# Patient Record
Sex: MALE | Race: WHITE | NOT HISPANIC OR LATINO | Employment: OTHER | URBAN - METROPOLITAN AREA
[De-identification: names, ages, dates, MRNs, and addresses within clinical notes are randomized per-mention and may not be internally consistent; named-entity substitution may affect disease eponyms.]

---

## 2017-07-03 ENCOUNTER — HOSPITAL ENCOUNTER (OUTPATIENT)
Dept: RADIOLOGY | Facility: HOSPITAL | Age: 62
Discharge: HOME | End: 2017-07-03
Attending: ORTHOPAEDIC SURGERY
Payer: MEDICAID

## 2017-07-03 ENCOUNTER — CLINICAL SUPPORT (OUTPATIENT)
Dept: LAB | Facility: HOSPITAL | Age: 62
End: 2017-07-03
Attending: ORTHOPAEDIC SURGERY
Payer: MEDICAID

## 2017-07-03 DIAGNOSIS — Z01.818 PREOP TESTING: ICD-10-CM

## 2017-07-03 DIAGNOSIS — Z01.818 PREOP TESTING: Primary | ICD-10-CM

## 2017-07-03 PROCEDURE — 71020 XR CHEST PA AND LATERAL: CPT | Mod: TC

## 2017-07-03 PROCEDURE — 93005 ELECTROCARDIOGRAM TRACING: CPT

## 2017-07-03 PROCEDURE — 71020 XR CHEST PA AND LATERAL: CPT | Mod: 26,,, | Performed by: RADIOLOGY

## 2017-07-26 ENCOUNTER — HOSPITAL ENCOUNTER (OUTPATIENT)
Dept: PREADMISSION TESTING | Facility: HOSPITAL | Age: 62
Discharge: HOME | End: 2017-07-26
Attending: ORTHOPAEDIC SURGERY
Payer: MEDICAID

## 2017-07-26 ENCOUNTER — ANESTHESIA EVENT (OUTPATIENT)
Dept: SURGERY | Facility: HOSPITAL | Age: 62
DRG: 470 | End: 2017-07-26
Payer: MEDICAID

## 2017-07-26 VITALS
HEIGHT: 70 IN | WEIGHT: 209.44 LBS | RESPIRATION RATE: 17 BRPM | SYSTOLIC BLOOD PRESSURE: 127 MMHG | TEMPERATURE: 99 F | OXYGEN SATURATION: 97 % | HEART RATE: 67 BPM | BODY MASS INDEX: 29.98 KG/M2 | DIASTOLIC BLOOD PRESSURE: 59 MMHG

## 2017-07-26 RX ORDER — LIDOCAINE HYDROCHLORIDE 10 MG/ML
1 INJECTION, SOLUTION EPIDURAL; INFILTRATION; INTRACAUDAL; PERINEURAL ONCE
Status: CANCELLED | OUTPATIENT
Start: 2017-07-26 | End: 2017-07-26

## 2017-07-26 RX ORDER — SODIUM CHLORIDE, SODIUM LACTATE, POTASSIUM CHLORIDE, CALCIUM CHLORIDE 600; 310; 30; 20 MG/100ML; MG/100ML; MG/100ML; MG/100ML
INJECTION, SOLUTION INTRAVENOUS CONTINUOUS
Status: CANCELLED | OUTPATIENT
Start: 2017-07-26

## 2017-07-26 NOTE — DISCHARGE INSTRUCTIONS
Your surgery is scheduled for 08/01/17.    Please report to Outpatient Surgery Intake Office on the 2nd FLOOR at 0530 a.m.          INSTRUCTIONS IMPORTANT!!!  ¨ Do not eat or drink after 12 midnight-including water. OK to brush teeth, no   gum, candy or mints!    ¨ Take only these medicines with a small swallow of water-morning of surgery. lisinopril        ____  Proceed to [facility] on 07/26/17 for additional blood test.        ____  Do not wear makeup, including mascara.  ____  No powder, lotions or creams to surgical area.  ____  Please remove all jewelry, including piercings and leave at home.  ____  No money or valuables needed. Please leave at home.  ____  Please bring any documents given by your doctor.  ____  If going home the same day, arrange for a ride home. You will not be able to             drive if Anesthesia was used.  ____  Children under 18 years require a parent / guardian present the entire time             they are in surgery / recovery.  ____  Wear loose fitting clothing. Allow for dressings, bandages.  ____  Stop Aspirin, Ibuprofen, Motrin and Aleve at least 3-5 days before surgery, unless otherwise instructed by your doctor, or the nurse.   You MAY use Tylenol/acetaminophen until day of surgery.  ____  Wash the surgical area with Hibiclens the night before surgery, and again the             morning of surgery.  Be sure to rinse hibiclens off completely (if instructed by   nurse).  ____  If you take diabetic medication, do not take am of surgery unless instructed by Doctor.  ____  Call MD for temperature above 101 degrees.  ____ Stop taking any Fish Oil supplement or any Vitamins that contain Vitamin E at least 5 days prior to surgery.  ____ Do Not wear your contact lenses the day of your procedure.  You may wear your glasses.        I have read or had read and explained to me, and understand the above information.  Additional comments or instructions:  For additional questions  call [phone]     Take a Hibiclens shower twice a day for 3 days prior to surgery, including the morning of surgery.   Gargle with Listerine twice a day for 3 days prior to surgery, including the morning of surgery.      Pre-Op Bathing Instructions    Before surgery, you can play an important role in your own health.    Because skin is not sterile, we need to be sure that your skin is as free of germs as possible. By following the instructions below, you can reduce the number of germs on your skin before surgery.    IMPORTANT: You will need to shower with a special soap called Hibiclens*, available at any pharmacy.  If you are allergic to Chlorhexidine (the antiseptic in Hibiclens), use an antibacterial soap such as Dial Soap for your preoperative shower.  You will shower with Hibiclens both the night before your surgery and the morning of your surgery.  Do not use Hibiclens on the head, face or genitals to avoid injury to those areas.    STEP #1: THE NIGHT BEFORE YOUR SURGERY     1. Do not shave the area of your body where your surgery will be performed.  2. Shower and wash your hair and body as usual with your normal soap and shampoo.  3. Rinse your hair and body thoroughly after you shower to remove all soap residue.  4. With your hand, apply one packet of Hibiclens soap to the surgical site.   5. Wash the site gently for five (5) minutes. Do not scrub your skin too hard.   6. Do not wash with your regular soap after Hibiclens is used.  7. Rinse your body thoroughly.  8. Pat yourself dry with a clean, soft towel.  9. Do not use lotion, cream, or powder.  10. Wear clean clothes.    STEP #2: THE MORNING OF YOUR SURGERY     1. Repeat Step #1.    * Not to be used by people allergic to Chlorhexidine.      Total Hip Replacement  Total hip replacement surgery almost always reduces joint pain. During this surgery, your problem hip joint is replaced with an artificial joint, called a prosthesis.  Benefits of hip  replacement  Total hip replacement surgery almost always:  · Stops or greatly reduces hip pain. Even the pain from surgery should go away within weeks.  · Increases leg function. Without hip pain, youll be able to use your legs more. This will build up your muscles.  · Improves quality of life by allowing you to do daily tasks and low-impact activities in greater comfort.  · Provides years of easier movement. Most total hip replacements last for many years.  Your surgical experience  You will most likely arrive at the hospital on the morning of surgery. In many cases, pre-op tests are done days or even weeks ahead of time. Follow all of your surgeons instructions on preparing for surgery. When you arrive, youll be given forms to fill out. You will also talk with the anesthesiologist,  the doctor who gives the anesthesia, if you havent done so already.  Preparing for surgery  You will be told when to stop eating and drinking before surgery. If you take daily medicines, especially blood thinners, ask your doctor if you should still take them the morning of surgery. You may need to stop certain medicines several days or weeks before the surgery. At the hospital your temperature, pulse, breathing, and blood pressure will be checked. An IV (intravenous) line will be started to provide fluids and medicines needed during surgery.    The surgical procedure  When the surgical team is ready, youll be taken to the operating room. There youll be given anesthesia. The anesthesia will help you sleep through surgery, or it will make you numb from the waist down. Then an incision is made, giving the surgeon access to your hip joint. The damaged ball is removed, and the socket is prepared to hold the prosthesis. After the new joint is in place, the incision is closed with staples or stitches.  Preparing the bone  The hip is a ball-and-socket joint. The ball is cut from the thighbone, and the surface of the old socket is  smoothed. Then the new socket is put into the pelvis. The socket is usually press-fit and may be held in place with screws. A press-fit prosthesis has tiny pores on its surface that your bone will grow into. Cement or press-fit may be used to hold the ball-and-stem portion of the hip replacement.    Joining the new parts  The new hip stem is inserted into the upper portion of your thighbone. After the stem is secure in the thighbone, the new ball and socket are joined. The stem of the prosthesis may be held with cement or press-fit. Your surgeon will choose the method that is best for you.  In the recovery room  After surgery youll be sent to the recovery room, also called the PACU (postanesthesia care unit). Your condition will be watched closely, and youll be given pain medications. You may have a catheter (small tube) in your bladder and a drain in your hip. To keep your new joint stable, a foam wedge or pillows may be placed between your legs. In some cases, a brace is used.  Risks and complications  As with any surgery, hip replacement has possible risks and complications. These include the following:  · Reaction to the anesthesia  · Blood clots  · Infection  · Dislocation of the joint or loosening of the prosthesis  · Fracture  · Wearing out the prosthetic  · Damage to nearby blood vessels, bones, or nerves  · Thigh pain   Date Last Reviewed: 8/28/2015  © 9126-1925 [employer]. [address] [place] [address]. All rights reserved. This information is not intended as a substitute for professional medical care. Always follow your healthcare professional's instructions.    Getting Ready for Hip Replacement Surgery  Take steps ahead of time to make recovery easier and safe. Make a few simple changes around the house. Also, arrange for someone to help you for a couple of weeks after your hip replacement. Before surgery, your surgeon may talk with you about ways to manage blood loss. You  may also be told to see your primary care healthcare provider and your dentist. Treating health and dental problems now may improve healing after a joint replacement. If youre a smoker, do your best to stop or cut down.    Prepare at home  Here are some suggestions:   · Stock up on canned and frozen foods. Store all supplies between waist and shoulder level.  · Prepare a room on the main living level if you usually sleep upstairs. Or set things up so you have to go upstairs only once a day.  ·  clutter and remove throw rugs. Tape down electrical cords.  Use special equipment  Special equipment may help you have a safer and easier recovery. If pre-op training is offered, try using the devices before surgery. Some of the most helpful equipment is listed below.  · An elevated toilet seat  · A shower seat  · Handrails and grab bars to help you steady yourself, especially when getting in and out of the tub  · A grasping device to pull on socks and shoes     Managing blood loss  During major surgery, a significant amount of blood loss can happen. This can cause the bodys red blood cell level to drop below a healthy range. Blood management can help prepare your body for surgery and recovery. There are three main ways to manage blood loss:   · You may be able to donate your own blood before surgery. Then you can receive your banked blood as needed.  · You may receive blood donated by another person. This blood is screened to rule out disease.  · A drug called epoetin marjorie can be given before surgery. This drug stimulates the body to produce red blood cells, sometimes reducing the need for transfusion.  See your healthcare provider  Expect the following:  · You are likely to have lab or blood tests. You also may have an ECG (electrocardiogram). A chest X-ray may also be taken.  · Your healthcare provider will talk with you about health problems, such as diabetes and high blood pressure, which need to be controlled  before surgery.  · Work with your healthcare provider to clear up any infections before surgery. Doing so helps ensure a better recovery.  · For your best recovery, youll be told to take care of yourself ahead of time. Maintain proper nutrition and reduce alcohol intake.  · If you smoke, quit before surgery if possible. This may improve healing and reduce complications after joint replacement.  Discuss your medicines  Tell your surgeon about all of the medicines you take. This includes herbs and supplements, as well as prescription and over-the-counter medicines. Some medicines dont go well with anesthesia. Others, such as aspirin, blood thinners, and ibuprofen, increase bleeding. To avoid problems during surgery, you may need to stop taking certain medicines before hip replacement.  Finish dental work  Have any tooth or gum problems treated before surgery. Also, finish any dental work that may be under way. If you dont, germs in your mouth could enter the bloodstream and infect the new joint. This could delay your recovery. In an extreme case, infection in the new joint might mean that the prosthesis would have to be removed.  Date Last Reviewed: 10/1/2015  © 2841-9220 [employer]. [address] [place] [address]. All rights reserved. This information is not intended as a substitute for professional medical care. Always follow your healthcare professional's instructions.    Anesthesia: General Anesthesia  Youre due to have surgery. During surgery, youll be given medication called anesthesia. (It is also called anesthetic.) This will keep you comfortable and pain-free. Your anesthesia provider will use general anesthesia. This sheet tells you more about it.  What is general anesthesia?     You are watched continuously during your procedure by the anesthesia provider   General anesthesia puts you into a state like deep sleep. It goes into the bloodstream (IV anesthetics), into the lungs  (gas anesthetics), or both. You feel nothing during the procedure. You will not remember it. During the procedure, the anesthesia provider monitors you continuously. He or she checks your heart rate and rhythm, blood pressure, breathing, and blood oxygen.  · IV Anesthetics. IV anesthetics are given through an IV line in your arm. Theyre often given first. This is so you are asleep before a gas anesthetic is started. Some kinds of IV anesthetics relieve pain. Others relax you. Your doctor will decide which kind is best in your case.  · Gas Anesthetics. Gas anesthetics are breathed into the lungs. They are often used to keep you asleep. They can be given through a facemask or a tube placed in your larynx or trachea (breathing tube).  ¨ If you have a facemask, your anesthesia provider will most likely place it over your nose and mouth while youre still awake. Youll breathe oxygen through the mask as your IV anesthetic is started. Gas anesthetic may be added through the mask.  ¨ If you have a tube in the larynx or trachea, it will be inserted into your throat after youre asleep.  Anesthesia tools and medications  You will likely have:  · IV anesthetics. These are put into an IV line into your bloodstream.  · Gas anesthetics. You breathe these anesthetics into your lungs, where they pass into your bloodstream.  · Pulse oximeter. This is a small clip that is attached to the end of your finger. This measures your blood oxygen level.  · Electrocardiography leads (electrodes). These are small sticky pads that are placed on your chest. They record your heart rate and rhythm.  · Blood pressure cuff. This reads your blood pressure.  Risks and possible complications  General anesthesia has some risks. These include:  · Breathing problems  · Nausea and vomiting  · Sore throat or hoarseness (usually temporary)  · Allergic reaction to the anesthetic  · Irregular heartbeat (rare)  · Cardiac arrest (rare)   Anesthesia  safety  · Follow all instructions you are given for how long not to eat or drink before your procedure.  · Be sure your doctor knows what medications and drugs you take. This includes over-the-counter medications, herbs, supplements, alcohol or other drugs. You will be asked when those were last taken.  · Have an adult family member or friend drive you home after the procedure.  · For the first 24 hours after your surgery:  ¨ Do not drive or use heavy equipment.  ¨ Have a trusted family member or spouse make important decisions or sign documents.  ¨ Avoid alcohol.  ¨ Have a responsible adult stay with you. He or she can watch for problems and help keep you safe.  Date Last Reviewed: 10/16/2014  © 8291-8627 [employer] [address] [place] [address], [place] [address]. All rights reserved. This information is not intended as a substitute for professional medical care. Always follow your healthcare professional's instructions.

## 2017-07-26 NOTE — ANESTHESIA PREPROCEDURE EVALUATION
07/26/2017  Heri Medina is a 61 y.o., male for right hip arthroplasty anterior approach under GETA on 8-1-17    Past Medical History:   Diagnosis Date    Hyperlipidemia     Hypertension        Past Surgical History:   Procedure Laterality Date    APPENDECTOMY  1984    hydrocel  1978    scrotum           Anesthesia Evaluation    I have reviewed the Patient Summary Reports.    I have reviewed the Nursing Notes.   I have reviewed the Medications.     Review of Systems  Anesthesia Hx:  No problems with previous Anesthesia  Denies Family Hx of Anesthesia complications.   Denies Personal Hx of Anesthesia complications.   Social:  Former Smoker, Alcohol Use Quit smoking in 2005  Smokes cigars   Hematology/Oncology:  Hematology Normal   Oncology Normal     EENT/Dental:EENT/Dental Normal   Cardiovascular:   Exercise tolerance: good Hypertension, well controlled hyperlipidemia <3METS   Pulmonary:   Sleep Apnea, CPAP CPap setting 4   Renal/:  Renal/ Normal     Hepatic/GI:  Hepatic/GI Normal    Musculoskeletal:   Arthritis (right hip)     Neurological:  Neurology Normal    Endocrine:   Diabetes, well controlled, type 2  Diabetes , most recent HgA1c value was 6.0 on 3/2017.    Psych:  Psychiatric Normal           Physical Exam  General:  Well nourished    Airway/Jaw/Neck:  Airway Findings: Mouth Opening: Normal General Airway Assessment: Adult  Mallampati: III  Improves to II with phonation.  TM Distance: Normal, at least 6 cm  Jaw/Neck Findings:  Neck ROM: Normal ROM      Dental:  Dental Findings: In tact, Periodontal disease, Mild   Chest/Lungs:  Chest/Lungs Findings: Clear to auscultation, Normal Respiratory Rate     Heart/Vascular:  Heart Findings: Rhythm: Regular Rhythm  Sounds: Normal        Mental Status:  Mental Status Findings:  Cooperative, Alert and Oriented         Anesthesia Plan  Type of Anesthesia,  risks & benefits discussed:  Anesthesia Type:  general, regional  Patient's Preference:   Intra-op Monitoring Plan:   Intra-op Monitoring Plan Comments:   Post Op Pain Control Plan: multimodal analgesia and IV/PO Opioids PRN  Post Op Pain Control Plan Comments:   Induction:   IV  Beta Blocker:  Patient is not currently on a Beta-Blocker (No further documentation required).       Informed Consent: Patient understands risks and agrees with Anesthesia plan.  Questions answered.   ASA Score: 2     Day of Surgery Review of History & Physical:    H&P update referred to the surgeon.         Ready For Surgery From Anesthesia Perspective.

## 2017-08-01 ENCOUNTER — ANESTHESIA (OUTPATIENT)
Dept: SURGERY | Facility: HOSPITAL | Age: 62
DRG: 470 | End: 2017-08-01
Payer: MEDICAID

## 2017-08-01 ENCOUNTER — HOSPITAL ENCOUNTER (INPATIENT)
Facility: HOSPITAL | Age: 62
LOS: 1 days | Discharge: HOME | DRG: 470 | End: 2017-08-02
Attending: ORTHOPAEDIC SURGERY | Admitting: ORTHOPAEDIC SURGERY
Payer: MEDICAID

## 2017-08-01 DIAGNOSIS — M25.559 HIP PAIN: ICD-10-CM

## 2017-08-01 DIAGNOSIS — E78.5 HYPERLIPIDEMIA ASSOCIATED WITH TYPE 2 DIABETES MELLITUS: ICD-10-CM

## 2017-08-01 DIAGNOSIS — E11.59 HYPERTENSION ASSOCIATED WITH DIABETES: ICD-10-CM

## 2017-08-01 DIAGNOSIS — E11.69 HYPERLIPIDEMIA ASSOCIATED WITH TYPE 2 DIABETES MELLITUS: ICD-10-CM

## 2017-08-01 DIAGNOSIS — I15.2 HYPERTENSION ASSOCIATED WITH DIABETES: ICD-10-CM

## 2017-08-01 LAB
BASOPHILS # BLD AUTO: 0.03 K/UL
BASOPHILS NFR BLD: 0.4 %
DIFFERENTIAL METHOD: ABNORMAL
EOSINOPHIL # BLD AUTO: 0 K/UL
EOSINOPHIL NFR BLD: 0.5 %
ERYTHROCYTE [DISTWIDTH] IN BLOOD BY AUTOMATED COUNT: 13.7 %
HCT VFR BLD AUTO: 40.7 %
HGB BLD-MCNC: 13 G/DL
LYMPHOCYTES # BLD AUTO: 1 K/UL
LYMPHOCYTES NFR BLD: 12.6 %
MCH RBC QN AUTO: 25.1 PG
MCHC RBC AUTO-ENTMCNC: 31.9 G/DL
MCV RBC AUTO: 79 FL
MONOCYTES # BLD AUTO: 0.2 K/UL
MONOCYTES NFR BLD: 2.4 %
NEUTROPHILS # BLD AUTO: 6.7 K/UL
NEUTROPHILS NFR BLD: 83.7 %
PLATELET # BLD AUTO: 227 K/UL
PMV BLD AUTO: 9.8 FL
POCT GLUCOSE: 111 MG/DL (ref 70–110)
RBC # BLD AUTO: 5.18 M/UL
WBC # BLD AUTO: 7.96 K/UL

## 2017-08-01 PROCEDURE — 0SR90JZ REPLACEMENT OF RIGHT HIP JOINT WITH SYNTHETIC SUBSTITUTE, OPEN APPROACH: ICD-10-PCS | Performed by: ORTHOPAEDIC SURGERY

## 2017-08-01 PROCEDURE — 97110 THERAPEUTIC EXERCISES: CPT

## 2017-08-01 PROCEDURE — 25000003 PHARM REV CODE 250: Performed by: ANESTHESIOLOGY

## 2017-08-01 PROCEDURE — 25000003 PHARM REV CODE 250: Performed by: NURSE ANESTHETIST, CERTIFIED REGISTERED

## 2017-08-01 PROCEDURE — 71000039 HC RECOVERY, EACH ADD'L HOUR: Performed by: ORTHOPAEDIC SURGERY

## 2017-08-01 PROCEDURE — 97165 OT EVAL LOW COMPLEX 30 MIN: CPT

## 2017-08-01 PROCEDURE — 63600175 PHARM REV CODE 636 W HCPCS: Performed by: NURSE ANESTHETIST, CERTIFIED REGISTERED

## 2017-08-01 PROCEDURE — 27201423 OPTIME MED/SURG SUP & DEVICES STERILE SUPPLY: Performed by: ORTHOPAEDIC SURGERY

## 2017-08-01 PROCEDURE — 97161 PT EVAL LOW COMPLEX 20 MIN: CPT

## 2017-08-01 PROCEDURE — 36000711: Performed by: ORTHOPAEDIC SURGERY

## 2017-08-01 PROCEDURE — 63600175 PHARM REV CODE 636 W HCPCS: Performed by: ORTHOPAEDIC SURGERY

## 2017-08-01 PROCEDURE — 36000710: Performed by: ORTHOPAEDIC SURGERY

## 2017-08-01 PROCEDURE — 25000003 PHARM REV CODE 250: Performed by: ORTHOPAEDIC SURGERY

## 2017-08-01 PROCEDURE — C1776 JOINT DEVICE (IMPLANTABLE): HCPCS | Performed by: ORTHOPAEDIC SURGERY

## 2017-08-01 PROCEDURE — 37000009 HC ANESTHESIA EA ADD 15 MINS: Performed by: ORTHOPAEDIC SURGERY

## 2017-08-01 PROCEDURE — 85025 COMPLETE CBC W/AUTO DIFF WBC: CPT

## 2017-08-01 PROCEDURE — 36415 COLL VENOUS BLD VENIPUNCTURE: CPT

## 2017-08-01 PROCEDURE — 71000033 HC RECOVERY, INTIAL HOUR: Performed by: ORTHOPAEDIC SURGERY

## 2017-08-01 PROCEDURE — C1729 CATH, DRAINAGE: HCPCS | Performed by: ORTHOPAEDIC SURGERY

## 2017-08-01 PROCEDURE — 37000008 HC ANESTHESIA 1ST 15 MINUTES: Performed by: ORTHOPAEDIC SURGERY

## 2017-08-01 PROCEDURE — 97116 GAIT TRAINING THERAPY: CPT

## 2017-08-01 PROCEDURE — 11000001 HC ACUTE MED/SURG PRIVATE ROOM

## 2017-08-01 PROCEDURE — 94761 N-INVAS EAR/PLS OXIMETRY MLT: CPT

## 2017-08-01 DEVICE — SHELL ACE CLUSTER 60MM TTNM: Type: IMPLANTABLE DEVICE | Site: HIP | Status: FUNCTIONAL

## 2017-08-01 DEVICE — INSERT TRIDENT 36MM: Type: IMPLANTABLE DEVICE | Site: HIP | Status: FUNCTIONAL

## 2017-08-01 DEVICE — STEM ACC II 27 DEG SZ 5: Type: IMPLANTABLE DEVICE | Site: HIP | Status: FUNCTIONAL

## 2017-08-01 DEVICE — HEAD FEM V40 36MM +0MM BIO: Type: IMPLANTABLE DEVICE | Site: HIP | Status: FUNCTIONAL

## 2017-08-01 RX ORDER — KETAMINE HYDROCHLORIDE 50 MG/ML
INJECTION, SOLUTION INTRAMUSCULAR; INTRAVENOUS
Status: DISCONTINUED | OUTPATIENT
Start: 2017-08-01 | End: 2017-08-01

## 2017-08-01 RX ORDER — METFORMIN HYDROCHLORIDE 500 MG/1
500 TABLET ORAL
Status: DISCONTINUED | OUTPATIENT
Start: 2017-08-02 | End: 2017-08-02 | Stop reason: HOSPADM

## 2017-08-01 RX ORDER — SODIUM CHLORIDE 0.9 % (FLUSH) 0.9 %
3 SYRINGE (ML) INJECTION
Status: DISCONTINUED | OUTPATIENT
Start: 2017-08-01 | End: 2017-08-02 | Stop reason: HOSPADM

## 2017-08-01 RX ORDER — DEXAMETHASONE SODIUM PHOSPHATE 4 MG/ML
10 INJECTION, SOLUTION INTRA-ARTICULAR; INTRALESIONAL; INTRAMUSCULAR; INTRAVENOUS; SOFT TISSUE ONCE
Status: COMPLETED | OUTPATIENT
Start: 2017-08-01 | End: 2017-08-01

## 2017-08-01 RX ORDER — FENTANYL CITRATE 50 UG/ML
INJECTION, SOLUTION INTRAMUSCULAR; INTRAVENOUS
Status: DISCONTINUED | OUTPATIENT
Start: 2017-08-01 | End: 2017-08-01

## 2017-08-01 RX ORDER — GLYCOPYRROLATE 0.2 MG/ML
INJECTION INTRAMUSCULAR; INTRAVENOUS
Status: DISCONTINUED | OUTPATIENT
Start: 2017-08-01 | End: 2017-08-01

## 2017-08-01 RX ORDER — NAPROXEN SODIUM 220 MG/1
81 TABLET, FILM COATED ORAL 2 TIMES DAILY
Status: DISCONTINUED | OUTPATIENT
Start: 2017-08-01 | End: 2017-08-02 | Stop reason: HOSPADM

## 2017-08-01 RX ORDER — ACETAMINOPHEN 10 MG/ML
1000 INJECTION, SOLUTION INTRAVENOUS EVERY 8 HOURS
Status: DISCONTINUED | OUTPATIENT
Start: 2017-08-01 | End: 2017-08-02 | Stop reason: HOSPADM

## 2017-08-01 RX ORDER — LIDOCAINE HYDROCHLORIDE 10 MG/ML
1 INJECTION, SOLUTION EPIDURAL; INFILTRATION; INTRACAUDAL; PERINEURAL ONCE
Status: DISCONTINUED | OUTPATIENT
Start: 2017-08-01 | End: 2017-08-01

## 2017-08-01 RX ORDER — HYDROMORPHONE HYDROCHLORIDE 2 MG/ML
INJECTION, SOLUTION INTRAMUSCULAR; INTRAVENOUS; SUBCUTANEOUS
Status: DISCONTINUED | OUTPATIENT
Start: 2017-08-01 | End: 2017-08-01

## 2017-08-01 RX ORDER — TRANEXAMIC ACID 100 MG/ML
1000 INJECTION, SOLUTION INTRAVENOUS
Status: DISCONTINUED | OUTPATIENT
Start: 2017-08-01 | End: 2017-08-01

## 2017-08-01 RX ORDER — CEFAZOLIN SODIUM 2 G/50ML
2 SOLUTION INTRAVENOUS
Status: DISCONTINUED | OUTPATIENT
Start: 2017-08-01 | End: 2017-08-01

## 2017-08-01 RX ORDER — MIDAZOLAM HYDROCHLORIDE 1 MG/ML
INJECTION INTRAMUSCULAR; INTRAVENOUS
Status: DISCONTINUED | OUTPATIENT
Start: 2017-08-01 | End: 2017-08-01

## 2017-08-01 RX ORDER — OXYCODONE AND ACETAMINOPHEN 5; 325 MG/1; MG/1
1 TABLET ORAL
Status: DISCONTINUED | OUTPATIENT
Start: 2017-08-01 | End: 2017-08-01

## 2017-08-01 RX ORDER — ZOLPIDEM TARTRATE 5 MG/1
5 TABLET ORAL NIGHTLY PRN
Status: DISCONTINUED | OUTPATIENT
Start: 2017-08-01 | End: 2017-08-02 | Stop reason: HOSPADM

## 2017-08-01 RX ORDER — FAMOTIDINE 20 MG/1
20 TABLET, FILM COATED ORAL 2 TIMES DAILY
Status: DISCONTINUED | OUTPATIENT
Start: 2017-08-01 | End: 2017-08-02 | Stop reason: HOSPADM

## 2017-08-01 RX ORDER — CEFAZOLIN SODIUM 2 G/50ML
2 SOLUTION INTRAVENOUS
Status: COMPLETED | OUTPATIENT
Start: 2017-08-01 | End: 2017-08-02

## 2017-08-01 RX ORDER — TRANEXAMIC ACID 100 MG/ML
1000 INJECTION, SOLUTION INTRAVENOUS
Status: COMPLETED | OUTPATIENT
Start: 2017-08-01 | End: 2017-08-01

## 2017-08-01 RX ORDER — MUPIROCIN 20 MG/G
1 OINTMENT TOPICAL
Status: DISCONTINUED | OUTPATIENT
Start: 2017-08-01 | End: 2017-08-01

## 2017-08-01 RX ORDER — SODIUM CHLORIDE 0.9 % (FLUSH) 0.9 %
3 SYRINGE (ML) INJECTION EVERY 8 HOURS
Status: DISCONTINUED | OUTPATIENT
Start: 2017-08-01 | End: 2017-08-01

## 2017-08-01 RX ORDER — SODIUM CHLORIDE 9 MG/ML
INJECTION, SOLUTION INTRAVENOUS CONTINUOUS
Status: ACTIVE | OUTPATIENT
Start: 2017-08-01 | End: 2017-08-02

## 2017-08-01 RX ORDER — SIMVASTATIN 40 MG/1
40 TABLET, FILM COATED ORAL NIGHTLY
Status: DISCONTINUED | OUTPATIENT
Start: 2017-08-01 | End: 2017-08-02 | Stop reason: HOSPADM

## 2017-08-01 RX ORDER — LIDOCAINE HCL/PF 100 MG/5ML
SYRINGE (ML) INTRAVENOUS
Status: DISCONTINUED | OUTPATIENT
Start: 2017-08-01 | End: 2017-08-01

## 2017-08-01 RX ORDER — PROPOFOL 10 MG/ML
VIAL (ML) INTRAVENOUS CONTINUOUS PRN
Status: DISCONTINUED | OUTPATIENT
Start: 2017-08-01 | End: 2017-08-01

## 2017-08-01 RX ORDER — SODIUM CHLORIDE, SODIUM LACTATE, POTASSIUM CHLORIDE, CALCIUM CHLORIDE 600; 310; 30; 20 MG/100ML; MG/100ML; MG/100ML; MG/100ML
INJECTION, SOLUTION INTRAVENOUS CONTINUOUS
Status: DISCONTINUED | OUTPATIENT
Start: 2017-08-01 | End: 2017-08-01

## 2017-08-01 RX ORDER — LISINOPRIL 5 MG/1
5 TABLET ORAL DAILY
Status: DISCONTINUED | OUTPATIENT
Start: 2017-08-01 | End: 2017-08-02 | Stop reason: HOSPADM

## 2017-08-01 RX ORDER — HYDROMORPHONE HYDROCHLORIDE 2 MG/ML
0.5 INJECTION, SOLUTION INTRAMUSCULAR; INTRAVENOUS; SUBCUTANEOUS
Status: DISCONTINUED | OUTPATIENT
Start: 2017-08-01 | End: 2017-08-02 | Stop reason: HOSPADM

## 2017-08-01 RX ORDER — ONDANSETRON 2 MG/ML
4 INJECTION INTRAMUSCULAR; INTRAVENOUS DAILY PRN
Status: DISCONTINUED | OUTPATIENT
Start: 2017-08-01 | End: 2017-08-01

## 2017-08-01 RX ORDER — ONDANSETRON 2 MG/ML
4 INJECTION INTRAMUSCULAR; INTRAVENOUS EVERY 12 HOURS PRN
Status: DISCONTINUED | OUTPATIENT
Start: 2017-08-01 | End: 2017-08-02 | Stop reason: HOSPADM

## 2017-08-01 RX ORDER — HYDROMORPHONE HYDROCHLORIDE 2 MG/ML
0.4 INJECTION, SOLUTION INTRAMUSCULAR; INTRAVENOUS; SUBCUTANEOUS EVERY 5 MIN PRN
Status: DISCONTINUED | OUTPATIENT
Start: 2017-08-01 | End: 2017-08-01

## 2017-08-01 RX ORDER — OXYCODONE HYDROCHLORIDE 5 MG/1
5 TABLET ORAL EVERY 4 HOURS PRN
Status: DISCONTINUED | OUTPATIENT
Start: 2017-08-01 | End: 2017-08-02 | Stop reason: HOSPADM

## 2017-08-01 RX ADMIN — EPHEDRINE SULFATE 5 MG: 50 INJECTION, SOLUTION INTRAMUSCULAR; INTRAVENOUS; SUBCUTANEOUS at 09:08

## 2017-08-01 RX ADMIN — SODIUM CHLORIDE, SODIUM LACTATE, POTASSIUM CHLORIDE, AND CALCIUM CHLORIDE: .6; .31; .03; .02 INJECTION, SOLUTION INTRAVENOUS at 10:08

## 2017-08-01 RX ADMIN — CEFAZOLIN SODIUM 2 G: 2 SOLUTION INTRAVENOUS at 04:08

## 2017-08-01 RX ADMIN — HYDROMORPHONE HYDROCHLORIDE 0.4 MG: 2 INJECTION INTRAMUSCULAR; INTRAVENOUS; SUBCUTANEOUS at 10:08

## 2017-08-01 RX ADMIN — OXYCODONE AND ACETAMINOPHEN 1 TABLET: 5; 325 TABLET ORAL at 12:08

## 2017-08-01 RX ADMIN — LISINOPRIL 5 MG: 5 TABLET ORAL at 04:08

## 2017-08-01 RX ADMIN — MIDAZOLAM HYDROCHLORIDE 2 MG: 1 INJECTION, SOLUTION INTRAMUSCULAR; INTRAVENOUS at 06:08

## 2017-08-01 RX ADMIN — PROPOFOL 150 MCG/KG/MIN: 10 INJECTION, EMULSION INTRAVENOUS at 07:08

## 2017-08-01 RX ADMIN — FENTANYL CITRATE 50 MCG: 50 INJECTION, SOLUTION INTRAMUSCULAR; INTRAVENOUS at 09:08

## 2017-08-01 RX ADMIN — OXYCODONE AND ACETAMINOPHEN 1 TABLET: 5; 325 TABLET ORAL at 03:08

## 2017-08-01 RX ADMIN — SIMVASTATIN 40 MG: 40 TABLET, FILM COATED ORAL at 09:08

## 2017-08-01 RX ADMIN — LIDOCAINE HYDROCHLORIDE 75 MG: 20 INJECTION, SOLUTION INTRAVENOUS at 07:08

## 2017-08-01 RX ADMIN — TRANEXAMIC ACID 1 G: 100 INJECTION, SOLUTION INTRAVENOUS at 07:08

## 2017-08-01 RX ADMIN — EPHEDRINE SULFATE 5 MG: 50 INJECTION, SOLUTION INTRAMUSCULAR; INTRAVENOUS; SUBCUTANEOUS at 08:08

## 2017-08-01 RX ADMIN — DEXTROSE 2 G: 50 INJECTION, SOLUTION INTRAVENOUS at 07:08

## 2017-08-01 RX ADMIN — SODIUM CHLORIDE, SODIUM LACTATE, POTASSIUM CHLORIDE, AND CALCIUM CHLORIDE: .6; .31; .03; .02 INJECTION, SOLUTION INTRAVENOUS at 07:08

## 2017-08-01 RX ADMIN — FENTANYL CITRATE 10 MCG: 50 INJECTION, SOLUTION INTRAMUSCULAR; INTRAVENOUS at 07:08

## 2017-08-01 RX ADMIN — ASPIRIN 81 MG 81 MG: 81 TABLET ORAL at 09:08

## 2017-08-01 RX ADMIN — KETAMINE HYDROCHLORIDE 30 MG: 50 INJECTION, SOLUTION, CONCENTRATE INTRAMUSCULAR; INTRAVENOUS at 09:08

## 2017-08-01 RX ADMIN — FAMOTIDINE 20 MG: 20 TABLET ORAL at 09:08

## 2017-08-01 RX ADMIN — SODIUM CHLORIDE: 0.9 INJECTION, SOLUTION INTRAVENOUS at 04:08

## 2017-08-01 RX ADMIN — ACETAMINOPHEN 1000 MG: 10 INJECTION, SOLUTION INTRAVENOUS at 09:08

## 2017-08-01 RX ADMIN — SODIUM CHLORIDE, SODIUM LACTATE, POTASSIUM CHLORIDE, AND CALCIUM CHLORIDE: .6; .31; .03; .02 INJECTION, SOLUTION INTRAVENOUS at 06:08

## 2017-08-01 RX ADMIN — TRANEXAMIC ACID 1 G: 100 INJECTION, SOLUTION INTRAVENOUS at 10:08

## 2017-08-01 RX ADMIN — KETAMINE HYDROCHLORIDE 20 MG: 50 INJECTION, SOLUTION, CONCENTRATE INTRAMUSCULAR; INTRAVENOUS at 07:08

## 2017-08-01 RX ADMIN — EPHEDRINE SULFATE 10 MG: 50 INJECTION, SOLUTION INTRAMUSCULAR; INTRAVENOUS; SUBCUTANEOUS at 09:08

## 2017-08-01 RX ADMIN — GLYCOPYRROLATE 0.2 MG: 0.2 INJECTION, SOLUTION INTRAMUSCULAR; INTRAVENOUS at 07:08

## 2017-08-01 RX ADMIN — DEXAMETHASONE SODIUM PHOSPHATE 10 MG: 4 INJECTION, SOLUTION INTRAMUSCULAR; INTRAVENOUS at 07:08

## 2017-08-01 RX ADMIN — FENTANYL CITRATE 40 MCG: 50 INJECTION, SOLUTION INTRAMUSCULAR; INTRAVENOUS at 07:08

## 2017-08-01 NOTE — PLAN OF CARE
Problem: Physical Therapy Goal  Goal: Physical Therapy Goal  Goals to be met by: 2017     Patient will increase functional independence with mobility by performin. Supine to sit with Supervision  2. Sit to supine with Supervision  3. Sit to stand transfer with Supervision  4. Gait  x 100 feet with Supervision using Rolling Walker.   5. Ascend/Descend 5 inch curb step with Supervision and Stand-by Assistance using Rolling Walker.  6. Lower extremity exercise program x10 reps per handout, with supervision    Outcome: Ongoing (interventions implemented as appropriate)  Patient s/p Anterior LOTUS; presents with 5/10 pain R hip; required SBA/CGA with bed mob, transfers and amb 75ft with RW with VCs for technique; will benefit from PT services while in hospital; pt has RW and BSC; recommend TTB for home use; follow up with MD.

## 2017-08-01 NOTE — OP NOTE
08/01/2017     Direct Anterior Total Hip Arthroplasty Procedure Note     Indications: Severe osteoarthritis right hip with symptoms limiting function.     Pre-operative Diagnosis: Osteoarthritis Right hip     Post-operative Diagnosis: Osteoarthritis Right hip    Procedure: Right LOTUS via Direct Anterior Approach    Surgeon: Paulie Jules MD    Assistants: Ander Aguero MD      Anesthesia: General endotracheal anesthesia     Indications:   The patient was seen in the Holding Area. The risks, benefits, complications, treatment options, and expected outcomes were discussed with the patient. The risks and potential complications of their problem and proposed treatment include but are not limited to infection, nerve injury, vascular injury, persistent pain, potential skin necrosis, deep vein thrombosis, possible pulmonary embolus, complications of the anesthetics and failure of the implant. The patient concurred with the proposed plan, giving informed consent. The site of surgery properly noted/marked     Implant: Mey LOTUS: Accolade II 5 stem 127 degree neck; 60 tritanium cup; +0 mm 36 mm ceramic head; 0 degree liner; no screws    Procedure Details   The patient was taken to Operating Room 6, identified as Heri Medina  and the procedure verified as right Total Hip Arthroplasty. A Time Out was held and the above information confirmed. The patient was placed on the operating table in a supine position. Following the successful induction of anesthesia bilateral hips were prepped and draped in the usual sterile fashion. Prophylactic antibiotics and TXA were given.    A standard direct anterior style hip incision was made using the Smith-Millan interval. Appropriate retractors were placed. The lateral circiumflex artery was coagulated. The anterior hip capsule was excised. Intracapsular retractors were placed. Anterior acetabular marginal osteophytes were removed with the aid of a rongeur. The femoral neck was  osteotimized just above the trochanter in an oblique fashion with two parallel cuts. The napkin ring of bone was removed and the femoral head was removed. Findings included severe end-stage osteoarthritic changes to the femoral head, acetabulum with fraying of the labrum.     Attention was then turned to the acetabulum, which was peripherally freed of soft tissue and then reamed to 1 mm under the planned size. Cystic changes were noted within the cleaned acetabular subchondral bone bed which were grafted. A Tritanium cup was then introduced in 40 degrees of abduction and 20 degrees of anteversion using fluoroscopy. This was firmly impacted. Two supplemental screws were placed. A standard liner was placed.     Attention was then turned to the femur. The foot of the bed was lowered and appropriate retractors were placed. The femur was opened with a rongeur, curet and curved rasped. We then broached distally with the Mey Accolade II broaches. Trial reduction of the -5 mm head was undertaken. The hip was examined clinically and with fluoroscopy.   The patient's components were in good position and stability was very good. The trials were removed and a Mey Accolade II stem was inserted securely. We then placed a -5 mm ceramic head on the trunion and impacted it.    The hip was taken through a range of motion and found to be very stable. Fluoroscopy showed good position of the components. Leg length was near perfect. We instilled a dilute solution of betadine in the wound. The wound was copiously irrigated with saline with TXA.  The wound was then closed in layers. The skin was reapposed with monocryl and dermabond and steristrips. A dressing was applied.     Instrument, sponge, and needle counts were correct prior to wound closure and at the conclusion of the case.     Findings:   Arthritis     Estimated Blood Loss: 600cc    Drains: 1     Total IV Fluids: per anesthesia     Specimens: synovial fluid and head  retained for IRB approved study    Complications: None; patient tolerated the procedure well.     Disposition: PACU - hemodynamically stable.     Condition: stable

## 2017-08-01 NOTE — H&P
"Reason For Visit   right hip pain for right LOTUS    HPI   This is a 62 yo man who complains of right hip pain for 2 years  He says it is now hard to walk and he cannot run  He does not use a cane  He "waddles" he says when he walks  Pain is worse in the AM and he is stiff  He sleeps ok  Main complaint is that he cannot walk like he used to   Has had PT.    Returned to clinic after a few week with pain getting worse and wants to proceed with surgery    Called clinic then to schedule for August 1    Past Medical History:   Diagnosis Date    Diabetes mellitus     Pre diabetic    Hyperlipidemia     Hypertension        Past Surgical History:   Procedure Laterality Date    APPENDECTOMY  1984    hydrocel  1978    scrotum       Allergies   No Known Drug Allergies  No Known Environmental Allergies  No Known Food Allergies.    Current Meds   Rec: [id number].  List Reconciled and Reviewed.  Simvastatin 40 MG Oral Tablet;1 tablet per day; RPT  MetFORMIN HCl - 500 MG Oral Tablet;1 tablet per day; RPT  Lisinopril 5 MG Oral Tablet;1 tablet per day for protein; RPT.    Family Hx   Family history of malignant neoplasm: Father (V16.9) (Z80.9).    Personal Hx   Never smoker  No alcohol use    ROS   Review of systems have been reviewed and noted on the intake form.    Results   Xrays from 2/17/17 were reviewed  They show severe right hip arthritis and mild left hip arthritis.    Vital Signs    Recorded by Josue Gordon on 03 May 2017 01:46 PM  BP:124/76,   HR: 78 b/min,   Height: 6 ft , Weight: 209 lb , BMI: 28.3 kg/m2,   BMI Calculated: 28.35 ,   BSA Calculated: 2.17 ,   Pain Scale: 7,   Location:: right hip.    Physical Exam   General: Well-developed, well-nourished, no acute distress  Head: Atraumatic, normocephalic  Eyes: Normal range of motion, normal sclera  Ear: Normal  Dentition: normal, no loose or infected teeth   Neck:Normal range of motion  Lungs: Normal chest excursion, no increased work of breathing  Cardiac: Normal " rate by palpation  Abdomen: Soft, non-distended  Neurologic: Alert and oriented to person, place, and time.  No focal weakness  Skin: Skin is warm and dry.  No rash  Psychiatric:  Normal affect     Hip Exam:     Gait: ambulates without a cane, slight limp     Posture: shoulders are level, iliac crests are level, normal thoracic hyphosis and lumbar lordosis, no lateral curvature     Inspection: nl  Atrophy: none  Swelling: none  Ecchymosis:none     Palpation: nontender     Tenderness: none  Trochanter: none  Pubic area:none  Symphysis Pubis:none  Sacroiliac joint:none     Nodes/mases: none  Inguinal     Range of Motion:  Right side range of motion restricted due to pain at terminal ROM  Loss of full PROM ana rotation     Muscle Testing  Right  intact     Neurologic:  Distal Sensation - intact     Special Testing:  Trendelenberg  Leg Length  - lying: equal  .  Assessment   Arthritis of right hip (836.95) (M16.11)    Plan   Treatment options discussed  Hip packet given  Failed  nonop modalities  Wants to proceed with right LOTUS  Will schedule

## 2017-08-01 NOTE — PLAN OF CARE
Signed out from pacu per Dr Preston. Report called to Nadja Steel/5A. Transported to room 533 via stretcher,sr upx2.

## 2017-08-01 NOTE — BRIEF OP NOTE
Brief Operative Note    [id number]     (8/1/17)    Pre-op Diagnosis: R hip arthritis       Post-op Diagnosis: same    Procedure(s): R LOTUS      Anesthesia: Spinal    Surgeon(s): Dr. Jorge A Aguero PGY3    Staff Attending:  Jorge A     Estimated Blood Loss: 600           Drain: x1            Total IV Fluids: per anesthesia           Specimens: femoral head    Implants: Mey 60 cup, 5 femur    Complications: none    Findings: see op note           Disposition: awakened from anesthesia, extubated and taken to the recovery room in a stable condition, having suffered no apparent untoward event.           Condition: doing well without problems      Technique: anterior approach

## 2017-08-01 NOTE — PLAN OF CARE
"Received from OR per stretcher . Monitors  And O2 applied. VSS. See flow sheets. Message left for "Bri " of pt arrival and status.  "

## 2017-08-01 NOTE — PROGRESS NOTES
Patient transferred to floor. Patient dressing intact. Patient adams and hemovac intact.Family at bedside. Will continue to monitor.

## 2017-08-01 NOTE — TRANSFER OF CARE
"Anesthesia Transfer of Care Note    Patient: Heri Medina    Procedure(s) Performed: Procedure(s) (LRB):  ARTHROPLASTY-HIP (ANTERIOR APPROACH) (Right)    Patient location: PACU    Anesthesia Type: MAC and spinal    Transport from OR: Transported from OR on 6-10 L/min O2 by face mask with adequate spontaneous ventilation    Post pain: adequate analgesia    Post assessment: no apparent anesthetic complications    Post vital signs: stable    Level of consciousness: sedated    Nausea/Vomiting: no nausea/vomiting    Complications: none    Transfer of care protocol was followed      Last vitals:   Visit Vitals  /68   Pulse 68   Temp 36.2 °C (97.2 °F)   Resp 12   Ht 5' 10" (1.778 m)   Wt 94.3 kg (208 lb)   SpO2 97%   BMI 29.84 kg/m²     "

## 2017-08-01 NOTE — PT/OT/SLP EVAL
Occupational Therapy  Evaluation    Heri Medina   MRN: [id number]   Admitting Diagnosis: <principal problem not specified>    OT Date of Treatment: 08/01/17   OT Start Time: 1618  OT Stop Time: 1648  OT Total Time (min): 30 min    Billable Minutes:  Evaluation 15    Diagnosis: <principal problem not specified>   The encounter diagnosis was Hip pain.      Past Medical History:   Diagnosis Date    Diabetes mellitus     Pre diabetic    Hyperlipidemia     Hypertension       Past Surgical History:   Procedure Laterality Date    APPENDECTOMY  1984    hydrocel  1978    scrotum     \  General Precautions: Standard, fall  Orthopedic Precautions: RLE anterior precautions, RLE weight bearing as tolerated  Braces:            Patient History:  Living Environment  Lives With: alone  Living Arrangements: apartment  Living Environment Comment: pt lives alone in 1st floor apartment, no steps, tub/shower combo. Pt;s family lives nearby. Purchased RW and BSC for home use post surgery.   Equipment Currently Used at Home: none    Prior level of function:   Bed Mobility/Transfers: independent  Grooming: independent  Bathing: independent  Upper Body Dressing: independent  Lower Body Dressing: independent  Toileting: independent  Home Management Skills: independent  Driving License: Yes  Mode of Transportation: Car     Dominant hand: right    Subjective:  Communicated with nsg prior to session.    Chief Complaint: soreness  Patient/Family stated goals: return to PLOF     Pain/Comfort  Pain Rating 1: 5/10  Location - Side 1: Right  Pain Addressed 1: Pre-medicate for activity, Reposition, Distraction, Cessation of Activity    Objective:       Cognitive Exam:  Oriented to: Person, Place, Time and Situation  Follows Commands/attention: Follows multistep  commands  Communication: clear/fluent  Memory:  No Deficits noted  Safety awareness/insight to disability: intact  Coping skills/emotional control: Appropriate to  situation    Visual/perceptual:  Intact; wears glasses     Physical Exam:  Postural examination/scapula alignment: Rounded shoulder and Head forward  Skin integrity: Visible skin intact; surgical wound   Edema: None noted     Sensation:   Intact    Upper Extremity Range of Motion:  Right Upper Extremity: WFL  Left Upper Extremity: WFL    Upper Extremity Strength:  Right Upper Extremity: WFL  Left Upper Extremity: WFL   Strength: good     Fine motor coordination:   Intact    Gross motor coordination: WFL    Functional Mobility:  Bed Mobility:  Scooting/Bridging: Stand by Assistance  Supine to Sit: Stand by Assistance, Contact Guard Assistance  Sit to Supine: Contact Guard Assistance, Stand by Assistance    Transfers:  Sit <> Stand Assistance: Contact Guard Assistance, Stand By Assistance  Sit <> Stand Assistive Device: Rolling Walker    Functional Ambulation: SBA- CGA with RW; see PT notes     Activities of Daily Living:    LE Dressing Level of Assistance: Maximum assistance          Toileting Where Assessed:  (angie )               Balance:   Static Sit: NORMAL: No deviations seen in posture held statically  Dynamic Sit: GOOD: Maintains balance through MODERATE excursions of active trunk movement  Static Stand: FAIR+: Takes MINIMAL challenges from all directions  Dynamic stand: FAIR: Needs CONTACT GUARD during gait        AM-PAC 6 CLICK ADL  How much help from another person does this patient currently need?  1 = Unable, Total/Dependent Assistance  2 = A lot, Maximum/Moderate Assistance  3 = A little, Minimum/Contact Guard/Supervision  4 = None, Modified [place]/Independent    Putting on and taking off regular lower body clothing? : 3  Bathing (including washing, rinsing, drying)?: 3  Toileting, which includes using toilet, bedpan, or urinal? : 3  Putting on and taking off regular upper body clothing?: 4  Taking care of personal grooming such as brushing teeth?: 3  Eating meals?: 4  Total Score:  "20    AM-PAC Raw Score CMS "G-Code Modifier Level of Impairment Assistance   6 % Total / Unable   7 - 9 CM 80 - 100% Maximal Assist   10-14 CL 60 - 80% Moderate Assist   15 - 19 CK 40 - 60% Moderate Assist   20 - 22 CJ 20 - 40% Minimal Assist   23 CI 1-20% SBA / CGA   24 CH 0% Independent/ Mod I       Patient left supine with all lines intact, call button in reach, nsg notified and family, MD, and MD  present    Assessment:  Heri Medina is a 61 y.o. male with a medical diagnosis of <principal problem not specified> and presents with s/p R LOTUS. Pt would benefit from cont OT services in order to maximize functional independence. Recommending home with continued family support/assist as needed. TTB for home use    Rehab identified problem list/impairments: Rehab identified problem list/impairments: weakness, gait instability, impaired balance, impaired endurance, decreased lower extremity function, pain, orthopedic precautions, impaired functional mobilty    Rehab potential is good.    Activity tolerance: Good    Discharge recommendations: Discharge Facility/Level Of Care Needs: home     Barriers to discharge: Barriers to Discharge: Decreased caregiver support    Equipment recommendations: bath bench     GOALS:    Occupational Therapy Goals        Problem: Occupational Therapy Goal    Goal Priority Disciplines Outcome Interventions   Occupational Therapy Goal     OT, PT/OT Ongoing (interventions implemented as appropriate)    Description:  Goals to be met by: 8/3/17     Patient will increase functional independence with ADLs by performing:    LE Dressing with Supervision.  Grooming while standing with Supervision.  Toileting from toilet with Supervision for hygiene and clothing management.   Supine to sit with Supervision.  Stand pivot transfers with Supervision.  Toilet transfer to toilet with Supervision.  Upper extremity exercise program x10 reps per handout, with independence.                      PLAN:  " Patient to be seen 5 x/week to address the above listed problems via self-care/home management, therapeutic activities, therapeutic exercises  Plan of Care expires: 08/03/17  Plan of Care reviewed with: patient, family         Romi Kennedy, OT  08/01/2017

## 2017-08-01 NOTE — ANESTHESIA POSTPROCEDURE EVALUATION
"Anesthesia Post Evaluation    Patient: Heri Medina    Procedure(s) Performed: Procedure(s) (LRB):  ARTHROPLASTY-HIP (ANTERIOR APPROACH) (Right)    Final Anesthesia Type: general  Patient location during evaluation: PACU  Patient participation: Yes- Able to Participate  Level of consciousness: awake and alert  Post-procedure vital signs: reviewed and stable  Pain management: adequate  Airway patency: patent  PONV status at discharge: No PONV  Anesthetic complications: no      Cardiovascular status: blood pressure returned to baseline  Respiratory status: unassisted  Hydration status: euvolemic  Follow-up not needed.        Visit Vitals  BP (!) 150/84   Pulse 72   Temp 36.3 °C (97.3 °F) (Skin)   Resp 19   Ht 5' 10" (1.778 m)   Wt 94.3 kg (208 lb)   SpO2 95%   BMI 29.84 kg/m²       Pain/[place] Score: Pain Assessment Performed: Yes (8/1/2017  3:25 PM)  Presence of Pain: complains of pain/discomfort (8/1/2017  3:25 PM)  Pain Rating Prior to Med Admin: 6 (8/1/2017  3:05 PM)  Pain Rating Post Med Admin: 4 (8/1/2017  3:25 PM)  [place] Score: 10 (8/1/2017  3:25 PM)  Modified [place] Score: 20 (8/1/2017  2:00 PM)      "

## 2017-08-01 NOTE — INTERVAL H&P NOTE
The patient has been examined and the H&P has been reviewed:    I concur with the findings and no changes have occurred since H&P was written.    Anesthesia/Surgery risks, benefits and alternative options discussed and understood by patient/family.          Active Hospital Problems    Diagnosis  POA    Hip pain [M25.559]  Yes      Resolved Hospital Problems    Diagnosis Date Resolved POA   No resolved problems to display.

## 2017-08-01 NOTE — CONSULTS
"PGY-2 [facility] CONSULT NOTE      Hospital Stay Day 0    HPI:  60 yo male with hx of DM2, HTN, HLD, right hip OA s/p Right TKA (Dr. Jules). Patient reported about 1 year of right sided hip and decided that the time was right for surgery. He states that he was dx with pre-dm and started on metformin and lisinipril for cardio-protection by his PCP. He has no complaints following surgery and had already worked with PT/OT.  Pain is well controlled at present.     Pt has no complaints and denies headache, visual disturbances, dizziness, chest pain, sob, nausea, vomiting, diarrhea, constipation, dysuria, frequency.        Scheduled Meds:   acetaminophen  1,000 mg Intravenous Q8H    aspirin  81 mg Oral BID    ceFAZolin (ANCEF) IVPB  2 g Intravenous Q8H    famotidine  20 mg Oral BID    lisinopril  5 mg Oral Daily    [START ON 2017] metformin  500 mg Oral Daily with breakfast    simvastatin  40 mg Oral QHS     Continuous Infusions:   sodium chloride 0.9% 100 mL/hr at 17 1601     PRN Meds:HYDROmorphone, ondansetron, oxycodone, sodium chloride 0.9%, zolpidem    Review of patient's allergies indicates:  No Known Allergies    Objectives:     Vitals(Most Recent)      BP  Min: 104/55  Max: 168/81  Temp  Av.6 °F (36.4 °C)  Min: 97.2 °F (36.2 °C)  Max: 98.5 °F (36.9 °C)  Pulse  Av.6  Min: 50  Max: 80  Resp  Avg: 15.4  Min: 9  Max: 20  SpO2  Av.9 %  Min: 90 %  Max: 99 %  Height  Av' 10" (177.8 cm)  Min: 5' 10" (177.8 cm)  Max: 5' 10" (177.8 cm)  Weight  Av.3 kg (208 lb)  Min: 94.3 kg (208 lb)  Max: 94.3 kg (208 lb)             Vitals([id number])  Temp:  [97.2 °F (36.2 °C)-98.5 °F (36.9 °C)]   Pulse:  [50-80]   Resp:  [9-20]   BP: (104-168)/(55-99)   SpO2:  [90 %-99 %]     I & O([id number])    Intake/Output Summary (Last 24 hours) at 17 1635  Last data filed at 17 1525   Gross per 24 hour   Intake             2000 ml   Output             1120 ml   Net              880 ml     Urinalysis  No " results for input(s): COLORU, CLARITYU, SPECGRAV, PHUR, PROTEINUA, GLUCOSEU, BILIRUBINCON, BLOODU, WBCU, RBCU, BACTERIA, MUCUS, NITRITE, LEUKOCYTESUR, UROBILINOGEN, HYALINECASTS in the last 24 hours.    General: AAOx3. NAD.  HEENT: NCAT. PERRLA. EOMI.   Neck: Supple. No JVD. No LAD.    CV: RRR. NL S1/S2. No M/R/G.   Chest: NL effort. CTAB. No R/R/W.   Abd: +BS x 4. Soft. ND/NT. No rebound or guarding.   Ext: No C/C/E. Peripheral pulses intact. Right lower extremity ROM limited 2/2 pain. Right knee dressed and ace bandage noted as clean, dry and intact. Wound vac in place and draining.   Skin: Intact. No rash. No lesions.   Neuro: CN II-XII intact. No focal deficit. Strength 5/5 throughout. Sensation intact.   Psych: Good judgement and reason. No A/V hallucinations. NL affect. No abnormal behaviors noted    LABS  CBC    Recent Labs  Lab 08/01/17  1148   WBC 7.96   RBC 5.18   HGB 13.0*   HCT 40.7      MCV 79*   MCH 25.1*   MCHC 31.9*     POCT-Glucose  POCT Glucose   Date Value Ref Range Status   08/01/2017 111 (H) 70 - 110 mg/dL Final       Imaging  7/3/2017: CXR : Slight nonspecific elevation right lung base.  No lung consolidation.  No pleural effusion or pneumothorax.  Heart size within normal limits.  No evidence for central pulmonary vascular congestion.  Visualized osseous structures grossly intact  7/3/2017: EKG: NSR      Assessment/Plan:  62 yo male with hx of DM2, HTN, HLD, right hip OA s/p Right TKA (Dr. Jules)    Right Hip OA s/p Right TKA  - Surgery performed by Dr. Jules  - Pain control per ortho  - IV abx: Ancef per ortho  - PT/OT    DM2  - Poct glucose 111  - home medication: metformin 500 Q day  - Consider low dose SSI and POCT glucose QID    HTN  - -168/55-99  - Home medication: lisinopril 5 mg, ASA continued    HLD  - Continuing home medication, simvastatin 40 mg     PT/OT: ordered and following  Code: full  Diet: Adult regular  Ppx: pepcid, dvt ppx per ortho  Dispo: follow up BP and  .     Thank you for letting us participate in the care of this patient. We will continue to follow.    Case d/w staff, Dr. Wharton.     Maria Alejandra Arreola D.O.  [facility] HO-2  08/01/2017

## 2017-08-01 NOTE — PLAN OF CARE
Problem: Occupational Therapy Goal  Goal: Occupational Therapy Goal  Goals to be met by: 8/3/17     Patient will increase functional independence with ADLs by performing:    LE Dressing with Supervision.  Grooming while standing with Supervision.  Toileting from toilet with Supervision for hygiene and clothing management.   Supine to sit with Supervision.  Stand pivot transfers with Supervision.  Toilet transfer to toilet with Supervision.  Upper extremity exercise program x10 reps per handout, with independence.    Outcome: Ongoing (interventions implemented as appropriate)  Pt would benefit from cont OT services in order to maximize functional independence. Recommending home with continued family support/assist as needed. TTB for home use

## 2017-08-02 VITALS
BODY MASS INDEX: 29.78 KG/M2 | RESPIRATION RATE: 18 BRPM | DIASTOLIC BLOOD PRESSURE: 56 MMHG | WEIGHT: 208 LBS | TEMPERATURE: 98 F | OXYGEN SATURATION: 96 % | SYSTOLIC BLOOD PRESSURE: 104 MMHG | HEIGHT: 70 IN | HEART RATE: 68 BPM

## 2017-08-02 PROBLEM — E11.69 HYPERLIPIDEMIA ASSOCIATED WITH TYPE 2 DIABETES MELLITUS: Status: ACTIVE | Noted: 2017-08-02

## 2017-08-02 PROBLEM — E78.5 HYPERLIPIDEMIA ASSOCIATED WITH TYPE 2 DIABETES MELLITUS: Status: ACTIVE | Noted: 2017-08-02

## 2017-08-02 PROBLEM — E11.59 HYPERTENSION ASSOCIATED WITH DIABETES: Status: ACTIVE | Noted: 2017-08-02

## 2017-08-02 PROBLEM — I15.2 HYPERTENSION ASSOCIATED WITH DIABETES: Status: ACTIVE | Noted: 2017-08-02

## 2017-08-02 LAB
ANION GAP SERPL CALC-SCNC: 6 MMOL/L
BASOPHILS # BLD AUTO: 0 K/UL
BASOPHILS NFR BLD: 0 %
BUN SERPL-MCNC: 13 MG/DL
CALCIUM SERPL-MCNC: 8.4 MG/DL
CHLORIDE SERPL-SCNC: 106 MMOL/L
CO2 SERPL-SCNC: 25 MMOL/L
CREAT SERPL-MCNC: 0.8 MG/DL
DIFFERENTIAL METHOD: ABNORMAL
EOSINOPHIL # BLD AUTO: 0 K/UL
EOSINOPHIL NFR BLD: 0 %
ERYTHROCYTE [DISTWIDTH] IN BLOOD BY AUTOMATED COUNT: 13.7 %
EST. GFR  (AFRICAN AMERICAN): >60 ML/MIN/1.73 M^2
EST. GFR  (NON AFRICAN AMERICAN): >60 ML/MIN/1.73 M^2
GLUCOSE SERPL-MCNC: 135 MG/DL
HCT VFR BLD AUTO: 33.3 %
HGB BLD-MCNC: 10.9 G/DL
LYMPHOCYTES # BLD AUTO: 1.5 K/UL
LYMPHOCYTES NFR BLD: 12.8 %
MCH RBC QN AUTO: 24.9 PG
MCHC RBC AUTO-ENTMCNC: 32.7 G/DL
MCV RBC AUTO: 76 FL
MONOCYTES # BLD AUTO: 1.3 K/UL
MONOCYTES NFR BLD: 10.9 %
NEUTROPHILS # BLD AUTO: 8.9 K/UL
NEUTROPHILS NFR BLD: 76 %
PLATELET # BLD AUTO: 241 K/UL
PMV BLD AUTO: 9.5 FL
POTASSIUM SERPL-SCNC: 4.4 MMOL/L
RBC # BLD AUTO: 4.37 M/UL
SODIUM SERPL-SCNC: 137 MMOL/L
WBC # BLD AUTO: 11.67 K/UL

## 2017-08-02 PROCEDURE — 80048 BASIC METABOLIC PNL TOTAL CA: CPT

## 2017-08-02 PROCEDURE — 63600175 PHARM REV CODE 636 W HCPCS: Performed by: ORTHOPAEDIC SURGERY

## 2017-08-02 PROCEDURE — 85025 COMPLETE CBC W/AUTO DIFF WBC: CPT

## 2017-08-02 PROCEDURE — 25000003 PHARM REV CODE 250: Performed by: ORTHOPAEDIC SURGERY

## 2017-08-02 PROCEDURE — 36415 COLL VENOUS BLD VENIPUNCTURE: CPT

## 2017-08-02 PROCEDURE — 97535 SELF CARE MNGMENT TRAINING: CPT

## 2017-08-02 PROCEDURE — 97530 THERAPEUTIC ACTIVITIES: CPT

## 2017-08-02 PROCEDURE — 97116 GAIT TRAINING THERAPY: CPT

## 2017-08-02 PROCEDURE — 97110 THERAPEUTIC EXERCISES: CPT

## 2017-08-02 PROCEDURE — 94761 N-INVAS EAR/PLS OXIMETRY MLT: CPT

## 2017-08-02 RX ORDER — NAPROXEN SODIUM 220 MG/1
81 TABLET, FILM COATED ORAL 2 TIMES DAILY
Qty: 56 TABLET | Refills: 0 | Status: SHIPPED | OUTPATIENT
Start: 2017-08-02 | End: 2019-06-03

## 2017-08-02 RX ORDER — FAMOTIDINE 20 MG/1
20 TABLET, FILM COATED ORAL 2 TIMES DAILY
Qty: 56 TABLET | Refills: 0 | Status: SHIPPED | OUTPATIENT
Start: 2017-08-02 | End: 2019-07-18

## 2017-08-02 RX ORDER — OXYCODONE HYDROCHLORIDE 5 MG/1
5 TABLET ORAL EVERY 4 HOURS PRN
Qty: 60 TABLET | Refills: 0 | Status: SHIPPED | OUTPATIENT
Start: 2017-08-02 | End: 2019-06-03

## 2017-08-02 RX ADMIN — OXYCODONE HYDROCHLORIDE 5 MG: 5 TABLET ORAL at 09:08

## 2017-08-02 RX ADMIN — ASPIRIN 81 MG 81 MG: 81 TABLET ORAL at 08:08

## 2017-08-02 RX ADMIN — SODIUM CHLORIDE: 0.9 INJECTION, SOLUTION INTRAVENOUS at 01:08

## 2017-08-02 RX ADMIN — HYDROMORPHONE HYDROCHLORIDE 0.5 MG: 2 INJECTION INTRAMUSCULAR; INTRAVENOUS; SUBCUTANEOUS at 03:08

## 2017-08-02 RX ADMIN — CEFAZOLIN SODIUM 2 G: 2 SOLUTION INTRAVENOUS at 12:08

## 2017-08-02 RX ADMIN — ACETAMINOPHEN 1000 MG: 10 INJECTION, SOLUTION INTRAVENOUS at 05:08

## 2017-08-02 RX ADMIN — METFORMIN HYDROCHLORIDE 500 MG: 500 TABLET, FILM COATED ORAL at 08:08

## 2017-08-02 RX ADMIN — FAMOTIDINE 20 MG: 20 TABLET ORAL at 08:08

## 2017-08-02 NOTE — PLAN OF CARE
Problem: Patient Care Overview  Goal: Plan of Care Review  Pt AA&O x 4. Pt occasionally complains of pain. PRN pain meds given as ordered. Dressing to hip dry and intact. Hemovac to bulb suction with serous sanguinous drainage noted. Respirations even and unlabored. No evidence of distress. Pt safety maintained. Bed in lowest position, side rails up x 3, call light and personal items within reach. Pt notified to call for assistance if needed. Pt verbalizes understanding. Will continue to monitor.

## 2017-08-02 NOTE — PROGRESS NOTES
Interval:   No issues overnight, not sure he feels ready to go home    Vitals:  Temp:  [97.2 °F (36.2 °C)-99 °F (37.2 °C)] 98.2 °F (36.8 °C)  Pulse:  [62-89] 68  Resp:  [9-20] 18  SpO2:  [90 %-99 %] 94 %  BP: (104-168)/(55-99) 104/56    Scheduled Meds:    acetaminophen  1,000 mg Intravenous Q8H    aspirin  81 mg Oral BID    famotidine  20 mg Oral BID    lisinopril  5 mg Oral Daily    metformin  500 mg Oral Daily with breakfast    simvastatin  40 mg Oral QHS     Continuous Infusions:    sodium chloride 0.9% 100 mL/hr at 08/02/17 0106     PRN Meds: HYDROmorphone, ondansetron, oxycodone, sodium chloride 0.9%, zolpidem    Diet: Diet Adult Regular    Trended Lab Data:    Recent Labs  Lab 08/01/17  1148 08/02/17  0441   WBC 7.96 11.67   HGB 13.0* 10.9*   HCT 40.7 33.3*    241   MCV 79* 76*   RDW 13.7 13.7   NA  --  137   K  --  4.4   CL  --  106   CO2  --  25   BUN  --  13   CREATININE  --  0.8   GLU  --  135*       I/O last 3 completed shifts:  In: 3580 [P.O.:480; I.V.:3100]  Out: 3720 [Urine:2710; Drains:410; Blood:600]    Exam:  NAD  rr by rp  No icnreased wob    RLE  Incision c/d/i  SILT s/s/sp/dp/t  Motor +ehl/fhl/ta/gc  Drain out 410, s/s        Impression:  61M s/p R LOTUS    Plan:  Cont therapy  Will reasses this pm for discharge possibility  Cont drain till after am therapy  Will discharge with asa, pepcid, and pain medicatiob  DVT/VTE prophylaxis: Denies any headaches, tremors, dizziness, vertigo, memory loss, confusion, weakness, numbness or tingling.  Weight Bearing Status: as tolerated    Dispo: pending therapy

## 2017-08-02 NOTE — PROGRESS NOTES
Progress Note  [facility]    Admit Date: 8/1/2017   LOS: 1 day     SUBJECTIVE:     Follow-up For:  S/p right LOTUS, consulted for medical mgmt    Patient reports no pain this AM. No new complaints. Tolerating diet.      Scheduled Meds:   acetaminophen  1,000 mg Intravenous Q8H    aspirin  81 mg Oral BID    famotidine  20 mg Oral BID    lisinopril  5 mg Oral Daily    metformin  500 mg Oral Daily with breakfast    simvastatin  40 mg Oral QHS     Continuous Infusions:   sodium chloride 0.9% 100 mL/hr at 08/02/17 0106     PRN Meds:HYDROmorphone, ondansetron, oxycodone, sodium chloride 0.9%, zolpidem    Review of patient's allergies indicates:  No Known Allergies    Review of Systems  Denies all    OBJECTIVE:     Vital Signs (Most Recent)  Temp: 98.2 °F (36.8 °C) (08/02/17 0758)  Pulse: 68 (08/02/17 0758)  Resp: 18 (08/02/17 0758)  BP: (!) 104/56 (08/02/17 0758)  SpO2: (!) 94 % (08/02/17 0410)    Vital Signs Range (Last 24H):  Temp:  [97.2 °F (36.2 °C)-99 °F (37.2 °C)]   Pulse:  [62-89]   Resp:  [9-20]   BP: (104-168)/(55-99)   SpO2:  [90 %-99 %]     I & O (Last 24H):  Intake/Output Summary (Last 24 hours) at 08/02/17 0934  Last data filed at 08/02/17 0600   Gross per 24 hour   Intake             2580 ml   Output             2920 ml   Net             -340 ml     Wt Readings from Last 3 Encounters:   08/02/17 94.3 kg (208 lb)   07/26/17 95 kg (209 lb 7 oz)   08/24/16 94.3 kg (208 lb)     Physical Exam:  Gen: NAD  HEENT: NC, AT  Chest: CTABL  CVS: RRR, no m/r/g  Abdomen: soft, NT, ND, no masses, +BS  Ext: Peripheral pulses intact. RLE: ROM limited 2/2 pain. Right hip dressed and noted as clean, dry and intact. Wound vac in place and draining.  Skin: ro rashes  Neuro: A&O x 3, CN's grossly intact, sensation intact to light touch  Psych: Normal mood, affect, thought content      Laboratory:  LABS  CBC    Recent Labs  Lab 08/01/17  1148 08/02/17  0441   WBC 7.96 11.67   RBC 5.18 4.37*   HGB 13.0* 10.9*   HCT  40.7 33.3*    241   MCV 79* 76*   MCH 25.1* 24.9*   MCHC 31.9* 32.7     BMP    Recent Labs  Lab 08/02/17  0441      K 4.4   CO2 25      BUN 13   CREATININE 0.8   *       POCT-Glucose  POCT Glucose   Date Value Ref Range Status   08/01/2017 111 (H) 70 - 110 mg/dL Final         Recent Labs  Lab 08/02/17  0441   CALCIUM 8.4*     LFT  No results for input(s): PROT, ALBUMIN, BILITOT, AST, ALKPHOS, ALT in the last 168 hours.  GFR    COAGS  No results for input(s): INR, APTT in the last 168 hours.    Invalid input(s): PT  CE  No results for input(s): TROPONINI, CKTOTAL, CKMB in the last 168 hours.  ABGs  No results for input(s): PH, PCO2, PO2, HCO3, POCSATURATED, BE in the last 24 hours.  BNP  No results for input(s): BNP in the last 168 hours.  UA  No results for input(s): COLORU, CLARITYU, SPECGRAV, PHUR, PROTEINUA, GLUCOSEU, BLOODU, WBCU, RBCU, BACTERIA, MUCUS in the last 24 hours.    Invalid input(s):  BILIRUBINCON  LAST HbA1c  No results found for: HGBA1C      Diagnostic Results:    IP CONSULT TO SOCIAL WORK/CASE MANAGEMENT  IP CONSULT TO FAMILY PRACTICE    ASSESSMENT/PLAN:   60 yo male with hx of DM2, HTN, HLD, right hip OA s/p Right LOTUS (Dr. Jules)     Right Hip OA s/p Right LOTUS  - Surgery performed by Dr. Jules  - Pain control per ortho  - IV abx: Ancef per ortho  - PT/OT     DM2  - Stable  - AM glucose 135  - home medication: metformin 500 Q day, holding while in hospital  - Consider low dose SSI and POCT glucose QID     HTN  - Stable  - -168/55-81  - Home medication: lisinopril 5 mg, ASA continued     HLD  - Continuing home medication, simvastatin 40 mg      PT/OT: ordered and following  Code: full  Diet: Adult regular  Ppx: pepcid, dvt ppx per ortho  Dispo: Monitor BP and BG.     8/2/2017 Trever Abebe MD  9:34 AM

## 2017-08-02 NOTE — PROGRESS NOTES
Pt discharged to home. All discharge instructions regarding new medications, pain management, activity, and follow up appointments discussed with patient and his brother.  Verbalized understanding.  Transport requested via wheelchair.  Will continue to monitor.

## 2017-08-02 NOTE — PT/OT/SLP EVAL
Physical Therapy  Evaluation/Treatment    Heri Medina   MRN: [id number]   Admitting Diagnosis: The encounter diagnosis was Hip pain.  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    scrotum       General Precautions: Standard, fall  Orthopedic Precautions: RLE anterior precautions, RLE weight bearing as tolerated   Braces: N/A       Do you have any cultural, spiritual, Shinto conflicts, given your current situation?: none    Patient History:  Lives With: alone  Living Arrangements: apartment  Living Environment Comment: Pt lives alone in 1st floor apartment, no steps, tub/shower combo; pt's family lives nearby; pt purchased a RW and BSC for home use post sx  Equipment Currently Used at Home: none      Previous Level of Function:  Ambulation Skills: independent  Transfer Skills: independent  ADL Skills: independent  Work/Leisure Activity: independent    Subjective:  Communicated with nurse prior to session.    Chief Complaint: soreness  Patient goals: return to PLOF    Pain/Comfort  Pain Rating 1: 5/10  Location - Side 1: Right  Location 1: hip  Pain Addressed 1: Pre-medicate for activity, Reposition, Distraction, Cessation of Activity  Pain Rating Post-Intervention 1: 5/10      Objective:   Patient found with: hemovac, adams catheter     Cognitive Exam:  Oriented to: Person, Place, Time and Situation    Follows Commands/attention: Follows multistep  commands  Communication: clear/fluent  Safety awareness/insight to disability: intact    Physical Exam:  Postural examination/scapula alignment: Rounded shoulder and Head  forward    Skin integrity: Visible skin intact and surgical incision  Edema: None noted     Sensation:   Intact    BUE~WFL for range and strength    Lower Extremity Range of Motion:  Right Lower Extremity: WFL  Left Lower Extremity: WFL    Lower Extremity Strength:  Right Lower Extremity: hip fl~3, abd~2-, knee/ankle WFL  Left Lower Extremity: WFL    Gross motor coordination: Fair 2/2 soreness    Functional Mobility:  Bed Mobility:  Scooting/Bridging: Stand by Assistance  Supine to Sit: Stand by Assistance, Contact Guard Assistance  Sit to Supine: Stand by Assistance, Contact Guard Assistance    Transfers:  Sit <> Stand Assistance: Stand By Assistance, Contact Guard Assistance  Sit <> Stand Assistive Device: Rolling Walker    Gait:   Gait Distance: 75ft  Assistance 1: Stand by Assistance, Contact Guard Assistance  Gait Assistive Device: Rolling walker  Gait Pattern: 3-point gait  Gait Deviation(s): decreased phong, decreased step length, decreased stride length (VCs to allow R hip to flex; look ahead instead of down; technqiue for turning)      Balance:   Static Sit: NORMAL: No deviations seen in posture held statically  Dynamic Sit: GOOD: Maintains balance through MODERATE excursions of active trunk movement  Static Stand: FAIR+: Takes MINIMAL challenges from all directions  Dynamic stand: FAIR: Needs CONTACT GUARD during gait    Therapeutic Activities and Exercises:  Patient instructed in anterior LOTUS precautions; instructed in proper technique for sup <>sit<>stand with RW and amb with RW including turning and stepping backwards; instructed in LE LOTUS for home and pt demonstrated 3-5 reps APs, QS, GS, heel slides- instructed in coordination of pursed lip breathing and ex; also demo'd standing ex to pt for SLR, hip abd, hip fl- pt demo'd hip fl while standing; bed mob as above with VCs for safety; sit<>stand transfers with VCs for techique; and VCs for amb as above including backward stepping; also instructed pt  in how to progress himself and demonstrated to him how to use a cane for amb.    AM-PAC 6 CLICK MOBILITY  How much help from another person does this patient currently need?   1 = Unable, Total/Dependent Assistance  2 = A lot, Maximum/Moderate Assistance  3 = A little, Minimum/Contact Guard/Supervision  4 = None, Modified [place]/Independent    Turning over in bed (including adjusting bedclothes, sheets and blankets)?: 3  Sitting down on and standing up from a chair with arms (e.g., wheelchair, bedside commode, etc.): 3  Moving from lying on back to sitting on the side of the bed?: 3  Moving to and from a bed to a chair (including a wheelchair)?: 3  Need to walk in hospital room?: 3  Climbing 3-5 steps with a railing?: 3  Total Score: 18     AM-PAC Raw Score CMS G-Code Modifier Level of Impairment Assistance   6 % Total / Unable   7 - 9 CM 80 - 100% Maximal Assist   10 - 14 CL 60 - 80% Moderate Assist   15 - 19 CK 40 - 60% Moderate Assist   20 - 22 CJ 20 - 40% Minimal Assist   23 CI 1-20% SBA / CGA   24 CH 0% Independent/ Mod I     Patient left HOB elevated with all lines intact, call button in reach, nurse notified and family present.    Assessment:   Heri Medina is a 61 y.o. male with a medical diagnosis of s/p anterior LOTUS;   Patient s/p Anterior LOTUS; presents with 5/10 pain R hip; required SBA/CGA with bed mob, transfers and amb 75ft with RW with VCs for technique; will benefit from PT services while in hospital; pt has RW and BSC; recommend TTB for home use; follow up with MD.  Rehab identified problem list/impairments: Rehab identified problem list/impairments: weakness, impaired endurance, impaired self care skills, impaired balance, gait instability, impaired functional mobilty, decreased lower extremity function, orthopedic precautions, pain    Rehab potential is excellent.    Activity tolerance: Good    Discharge recommendations: Discharge Facility/Level Of Care Needs: home (follow up with  MD)     Barriers to discharge: Barriers to Discharge: Decreased caregiver support    Equipment recommendations: Equipment Needed After Discharge: bath bench     GOALS:    Physical Therapy Goals        Problem: Physical Therapy Goal    Goal Priority Disciplines Outcome Goal Variances Interventions   Physical Therapy Goal     PT/OT, PT Ongoing (interventions implemented as appropriate)     Description:  Goals to be met by: 2017     Patient will increase functional independence with mobility by performin. Supine to sit with Supervision  2. Sit to supine with Supervision  3. Sit to stand transfer with Supervision  4. Gait  x 100 feet with Supervision using Rolling Walker.   5. Ascend/Descend 5 inch curb step with Supervision and Stand-by Assistance using Rolling Walker.  6. Lower extremity exercise program x10 reps per handout, with supervision                      PLAN:    Patient to be seen BID (M-F; daily wknds) to address the above listed problems via gait training, therapeutic activities, therapeutic exercises  Plan of Care expires: 17  Plan of Care reviewed with: patient, family          Sindi Ivan, PT  2017

## 2017-08-02 NOTE — PLAN OF CARE
Problem: Patient Care Overview  Goal: Plan of Care Review  Outcome: Ongoing (interventions implemented as appropriate)  Gamez catheter removed. Pt tolerated well.  Pt informed to notify nurse of first void post catheter removal. Pt verbalizes understanding.

## 2017-08-02 NOTE — PLAN OF CARE
Problem: Physical Therapy Goal  Goal: Physical Therapy Goal  Goals to be met by: 2017     Patient will increase functional independence with mobility by performin. Supine to sit with Supervision-MET 2017  2. Sit to supine with Supervision-MET 2017  3. Sit to stand transfer with Supervision-2017  4. Gait  x 100 feet with Supervision using Rolling Walker-2017.   5. Ascend/Descend 5 inch curb step with Supervision and Stand-by Assistance using Rolling Walker-2017  6. Lower extremity exercise program x10 reps per handout, with supervision-2017     Outcome: Outcome(s) achieved Date Met: 17  Patient demonstrated knowledge of hip precautions, pt reports 2/10 pain (soreness) R hip at rest; supervision/Nenita with sit<>stand with initial VCs for technique with stand to sit; supervision/Nenita with amb using RW ~350ft using 3pt gait initially 2/2 soreness ~6/10 with amb; pt instructed in up/down 1 step x 3 trials and up step with one rail with supervision/SBA;  pt demonstrated knowledge of sitting exercises; pt somewhat impulsive-reminders to take his time, think before he acts and keep hip precautions in mind; ice pack to R hip for soreness; pt ready to d/c today; will follow up with Dr. Stanford, OP PT or HH PT if needed.

## 2017-08-02 NOTE — PT/OT/SLP PROGRESS
Occupational Therapy  Treatment    Heri Medina   MRN: [id number]   Admitting Diagnosis: Hip pain    OT Date of Treatment: 08/02/17   OT Start Time: 1001  OT Stop Time: 1031  OT Total Time (min): 30 min    Billable Minutes:  Self Care/Home Management 20 and Therapeutic Activity 10    General Precautions: Standard, fall  Orthopedic Precautions: RLE anterior precautions, RLE weight bearing as tolerated  Braces: N/A    Subjective:  Communicated with nurseMinerva prior to session.    Pain/Comfort  Pain Rating 1: 3/10  Location - Side 1: Right  Location 1: hip  Pain Addressed 1: Distraction, Cessation of Activity, Nurse notified  Pain Rating Post-Intervention 1: 5/10    Objective:  Patient found with: peripheral IV, hemovac     Functional Mobility:  Bed Mobility: N/A    Transfers:   Sit <> Stand Assistance: Supervision  Sit <> Stand Assistive Device: Rolling Walker    Functional Ambulation: In room with Sup - using both RW and no AD    Activities of Daily Living:  LE Dressing Level of Assistance: Minimum assistance    Balance:   Static Sit: NORMAL: No deviations seen in posture held statically  Dynamic Sit: GOOD+: Maintains balance through MAXIMAL excursions of active trunk motion  Static Stand: GOOD-: Takes MODERATE challenges from all directions inconsistently  Dynamic stand: GOOD-: Needs SUPERVISION only during gait and able to self right with moderate     Therapeutic Activities and Exercises:  Patient agreeable to dressing activity - able to josé miguel briefs and shorts with set-up, but had some difficulty donning sock on R foot. Patient only required assist to feed hemovac line through shorts. Patient completed sit <> stands, x 4 trials, with RW and without. Sup-Mod I for standing trials - required some VCs and education on RW use.    AM-PAC 6 CLICK ADL   How much help from another person does this patient currently need?   1 = Unable, Total/Dependent Assistance  2 = A lot, Maximum/Moderate Assistance  3 = A little,  "Minimum/Contact Guard/Supervision  4 = None, Modified [place]/Independent    Putting on and taking off regular lower body clothing? : 3  Bathing (including washing, rinsing, drying)?: 3  Toileting, which includes using toilet, bedpan, or urinal? : 3  Putting on and taking off regular upper body clothing?: 4  Taking care of personal grooming such as brushing teeth?: 4  Eating meals?: 4  Total Score: 21     AM-PAC Raw Score CMS "G-Code Modifier Level of Impairment Assistance   6 % Total / Unable   7 - 8 CM 80 - 100% Maximal Assist   9-13 CL 60 - 80% Moderate Assist   14 - 19 CK 40 - 60% Moderate Assist   20 - 22 CJ 20 - 40% Minimal Assist   23 CI 1-20% SBA / CGA   24 CH 0% Independent/ Mod I       Patient left up in chair with all lines intact, call button in reach, RN notified and Dr. Jules present    ASSESSMENT: Patient with improving mobility. Min (A) for LB dsg 2* difficulty donning sock on RLE, but able to josé miguel brief and shorts with set-up.   Heir Medina is a 61 y.o. male with a medical diagnosis of Hip pain     Rehab identified problem list/impairments: Rehab identified problem list/impairments: weakness, impaired self care skills, impaired functional mobilty, gait instability, impaired balance, decreased lower extremity function, pain, orthopedic precautions    Rehab potential is excellent.    Activity tolerance: Good    Discharge recommendations: Discharge Facility/Level Of Care Needs: home     Barriers to discharge: Barriers to Discharge: Decreased caregiver support    Equipment recommendations: bath bench     GOALS:    Occupational Therapy Goals        Problem: Occupational Therapy Goal    Goal Priority Disciplines Outcome Interventions   Occupational Therapy Goal     OT, PT/OT Ongoing (interventions implemented as appropriate)    Description:  Goals to be met by: 8/3/17     Patient will increase functional independence with ADLs by performing:    LE Dressing with Supervision.  Grooming while " standing with Supervision.  Toileting from toilet with Supervision for hygiene and clothing management.   Supine to sit with Supervision.  Stand pivot transfers with Supervision.  Toilet transfer to toilet with Supervision.  Upper extremity exercise program x10 reps per handout, with independence.                      Plan:  Patient to be seen 5 x/week to address the above listed problems via self-care/home management, therapeutic activities, therapeutic exercises  Plan of Care expires: 08/03/17  Plan of Care reviewed with: patient         PRECIOUS Espinoza/USMAN  08/02/2017

## 2017-08-02 NOTE — DISCHARGE SUMMARY
Physician Discharge Summary     Patient Id: [id number]      Admit date: 8/1    Discharge date: 8/2    Admitting Physician: Dr. Jules    Discharge Physician: Dr. Jules    Admission Diagnoses: R end stage hip OA    Discharge Diagnoses: same    Admission Condition: good    Discharged Condition: good    Indication for Admission: to OR     Hospital Course: Patient presented to [facility] on day of scheduled surgery and underwent R total hip arthroplasty, please see operative report for further detail. Patient did well postoperatively and was found to be fit for discharge on POD# 1. Discharged home.        Consults: family medicine    Significant Diagnostic Studies: none    Treatments: surgery: L LOTUS    Discharge Exam:  Please see progress note from day of discharge    Disposition: Home or Self Care    Patient Instructions:     Discharge Medications  Refer to Discharge Medication List    Activity: activity as tolerated  Diet: regular diet  Wound Care: keep wound clean and dry

## 2017-08-02 NOTE — PT/OT/SLP PROGRESS
Physical Therapy  Treatment/Discharge    Heri Medina   MRN: [id number]   Admitting Diagnosis: Hip pain    PT Received On: 08/02/17  PT Start Time: 1042     PT Stop Time: 1142    PT Total Time (min): 60 min       Billable Minutes:  Gait [id number] minutes, Therapeutic Activity 15 minutes and Therapeutic Exercise 15 minutes    Treatment Type: Treatment  PT/PTA: PT     PTA Visit Number: 0       General Precautions: Standard, fall  Orthopedic Precautions: RLE anterior precautions, RLE weight bearing as tolerated   Braces: N/A    Do you have any cultural, spiritual, Temple conflicts, given your current situation?: none    Subjective:  Communicated with nurse prior to session.      Pain/Comfort  Pain Rating 1: 2/10  Location - Side 1: Right  Location 1: hip  Pain Addressed 1: Pre-medicate for activity, Reposition, Distraction, Cessation of Activity  Pain Rating Post-Intervention 1: 6/10    Objective:   Patient found with: hemovac    Functional Mobility:  Bed Mobility:   Sit to Supine: Supervision, Modified Independent    Transfers:  Sit <> Stand Assistance: Supervision, Modified Independent  Sit <> Stand Assistive Device: Rolling Walker    Gait:   Gait Distance: 350ft  Assistance 1: Supervision, Modified Independent  Gait Assistive Device: Rolling walker  Gait Pattern: 3-point gait  Gait Deviation(s): decreased phong (VCs to keep RW close especially during transitional movements)    Stairs:  Pt instructed in up/down step with RW with return demo x 3 trials with S/SBA; also up/down step with rail using B hands; also discussed using cane     Balance:   Static Sit: NORMAL: No deviations seen in posture held statically  Dynamic Sit: NORMAL: No deviations seen in posture held dynamically  Static Stand: GOOD/GOOD+: Takes MODERATE challenges from all directions  Dynamic stand: GOOD/GOOD+: Needs SUPERVISION/Mod I only during gait and able to self right with moderate      Therapeutic Activities and Exercises:  Pt found sitting  in bedside chair; pt demonstrated sitting exercise -APs, QS, GS, heel slides, FAQ,marches; and demonstrated standing ex to pt in RW- hip/knee fl, SLR, hip abd, knee fl with hip at 0; pt sit <>stand x 3 trials with VCs for technique in advancing R foot ahead to alleviate some pressure for a smoother transition; pt GT as described -VCs for pt to take his time and keep the RW close and not to let go of it before ready while transitioning to sit; instructed on step climbing as above; sit to supine as above-educated on using R hand to assist or LLE to assist RLE; ice pack to R hip; at 1:28- 1:33pm returned to give pt handout of RW instructions (including car transfers and tub bench transfers) which pt reviewed and answered any questions.    AM-PAC 6 CLICK MOBILITY  How much help from another person does this patient currently need?   1 = Unable, Total/Dependent Assistance  2 = A lot, Maximum/Moderate Assistance  3 = A little, Minimum/Contact Guard/Supervision  4 = None, Modified [place]/Independent    Turning over in bed (including adjusting bedclothes, sheets and blankets)?: 4  Sitting down on and standing up from a chair with arms (e.g., wheelchair, bedside commode, etc.): 3  Moving from lying on back to sitting on the side of the bed?: 4  Moving to and from a bed to a chair (including a wheelchair)?: 4  Need to walk in hospital room?: 4  Climbing 3-5 steps with a railing?: 4  Total Score: 23    AM-PAC Raw Score CMS G-Code Modifier Level of Impairment Assistance   6 % Total / Unable   7 - 9 CM 80 - 100% Maximal Assist   10 - 14 CL 60 - 80% Moderate Assist   15 - 19 CK 40 - 60% Moderate Assist   20 - 22 CJ 20 - 40% Minimal Assist   23 CI 1-20% SBA / CGA   24 CH 0% Independent/ Mod I     Patient left up in chair with all lines intact, call button in reach and nurse notified.    Assessment:  Heri Medina is a 61 y.o. male with a medical diagnosis of Hip pain   Patient demonstrated knowledge of hip  precautions, pt reports 2/10 pain (soreness) R hip at rest; supervision/Nenita with sit<>stand with initial VCs for technique with stand to sit; supervision/Nenita with amb using RW ~350ft using 3pt gait initially 2/2 soreness ~6/10 with amb; pt instructed in up/down 1 step x 3 trials and up step with one rail with supervision/SBA;  pt demonstrated knowledge of sitting exercises; pt somewhat impulsive-reminders to take his time, think before he acts and keep hip precautions in mind; ice pack to R hip for soreness; pt ready to d/c today; will follow up with Dr. Stanford, OP PT or HH PT if needed.  Rehab identified problem list/impairments: Rehab identified problem list/impairments: weakness, pain, decreased lower extremity function, decreased ROM, impaired functional mobilty, impaired balance    Rehab potential is excellent.    Activity tolerance: Good    Discharge recommendations: Discharge Facility/Level Of Care Needs: home (f/u with MD; possible OP PT or HH PT if needed)     Barriers to discharge: Barriers to Discharge: Decreased caregiver support    Equipment recommendations: Equipment Needed After Discharge: bath bench     GOALS:    Physical Therapy Goals     Not on file          Multidisciplinary Problems (Resolved)        Problem: Physical Therapy Goal    Goal Priority Disciplines Outcome Goal Variances Interventions   Physical Therapy Goal   (Resolved)     PT/OT, PT Outcome(s) achieved     Description:  Goals to be met by: 2017     Patient will increase functional independence with mobility by performin. Supine to sit with Supervision-MET 2017  2. Sit to supine with Supervision-MET 2017  3. Sit to stand transfer with Supervision-2017  4. Gait  x 100 feet with Supervision using Rolling Walker-2017.   5. Ascend/Descend 5 inch curb step with Supervision and Stand-by Assistance using Rolling Walker-2017  6. Lower extremity exercise program x10 reps per handout, with supervision-2017                        PLAN:   (d/c acute PT services )  Plan of Care reviewed with: patient         Sindi MARY Marzena, PT  08/02/2017

## 2017-08-02 NOTE — PLAN OF CARE
Problem: Occupational Therapy Goal  Goal: Occupational Therapy Goal  Goals to be met by: 8/3/17     Patient will increase functional independence with ADLs by performing:    LE Dressing with Supervision.  Grooming while standing with Supervision.  Toileting from toilet with Supervision for hygiene and clothing management.   Supine to sit with Supervision.  Stand pivot transfers with Supervision.  Toilet transfer to toilet with Supervision.  Upper extremity exercise program x10 reps per handout, with independence.     Outcome: Ongoing (interventions implemented as appropriate)  Patient with improving mobility. Min (A) for LB dsg 2* difficulty donning sock on RLE, but able to josé miguel brief and shorts with set-up.

## 2017-08-03 ENCOUNTER — PATIENT OUTREACH (OUTPATIENT)
Dept: ADMINISTRATIVE | Facility: CLINIC | Age: 62
End: 2017-08-03

## 2017-08-03 NOTE — PATIENT INSTRUCTIONS
Discharge Instructions for Total Hip Replacement Surgery  You had a hip replacement surgery. This means your natural hip was replaced with an artificial joint (prosthesis). You may be recovering at home or in a rehabilitation facility. Either way, you must take care of your new hip. Do this by moving and sitting the way you were taught in the hospital. Also, be sure to see your doctor for follow-up visits, and return to activity slowly. Because a total hip replacement is major surgery, it will be a few months before you can move comfortably.  Home care   Take your pain medicine exactly as directed.   Dont drive until your doctor says its OK. And never drive while taking opioid pain medicine.   Wear the support stockings you were given in the hospital. Wear them for 24 hours a day for 3 to 4 week(s).   To relieve discomfort at night, get up and move around.   Tell all your healthcare providers--including your dentist--about your artificial joint before any procedure. You may need to take antibiotics before dental work and other medical procedures to reduce the risk of infection.   Arrange to follow-up in 2-3 weeks after surgery for suture or staple removal if needed..   Check your incision as instructed by your provider for redness, swelling, tenderness, or drainage.   Avoid infection by washing your hands often. If an infection occurs, it will need to be treated immediately. So call your doctor right away if you think you may have an infection. Symptoms include a fever or an incision that leaks white, green, or yellow fluid.   Avoid soaking your incision in water (no hot tubs, bathtubs, swimming pools) until your doctor says its OK.   Wait until your provider gives permission to begin showering . Then shower as needed. Gently pat the incision dry if it gets wet. Dont rub the incision, or apply creams or lotions to it. And to avoid falling when showering, sit on a shower stool.   Dont sit for more  than 30 to 45 minutes at a time.  If advised by your physician to use hip precautions:   Use chairs with arms, and sit with your knees slightly lower than your hips. Dont sit on low or sagging chairs or couches.   Dont lean forward while sitting.   Dont cross your legs.   Keep your feet flat on the floor. Dont turn your foot or leg inward. This stresses your hip joint.   Use an elevated toilet seat for 6 weeks after surgery.   Ask your healthcare provider if its OK to sleep on your stomach or on the side that has the new hip. Use pillows between your legs when sleeping on your back or on your side.   Sit on a firm cushion when you ride in a car and avoid sitting too low. Try not to bend your hip too much when getting in and out of the car.   Dont bend at the hip when you bend over. Don't bend at the waist to put on socks and shoes. And avoid picking up items from the floor.  Moving safely   Use a cane, crutches, a walker, or handrails until your balance, flexibility, and strength improve. And remember to ask for help from others when you need it.   Free up your hands so that you can use them to keep balance. Use a magalis pack, apron, or pockets to carry things.   Follow your doctors orders regarding how much weight to put on the affected leg.   Walk often and do prescribed exercises as instructed.   Arrange your household to keep the items you need within reach.   Remove electrical cords, throw rugs, and anything else that may cause you to fall.   Use nonslip bath mats, grab bars, an elevated toilet seat, and a shower chair in your bathroom.  Follow-up  Make a follow-up appointment as directed by your doctor.  When to seek medical attention  Call 911 right away if you have any of the following:   Chest pain   Shortness of breath  Otherwise, call your doctor right away if you have any of the following:   Increased hip pain  Increased hip pain   Fever above 100.4°F (38°C) or shaking chills    Excessive swelling, increased drainage or redness around the incision   Swelling, tenderness, or cramps in your leg  Date Last Reviewed: 11/15/2015  © 7045-2664 [employer]. [address] [place] [address], [place] [address]. All rights reserved. This information is not intended as a substitute for professional medical care. Always follow your healthcare professional's instructions.

## 2017-08-03 NOTE — PT/OT/SLP DISCHARGE
Occupational Therapy Discharge Summary    Heri Medina  MRN: [id number]   Hip pain   Patient Discharged from acute Occupational Therapy on 8/2.  Please refer to prior OT note dated on 8/2 for functional status.     Assessment:   Patient appropriate for care in another setting.  GOALS:    Occupational Therapy Goals     Not on file          Multidisciplinary Problems (Resolved)        Problem: Occupational Therapy Goal    Goal Priority Disciplines Outcome Interventions   Occupational Therapy Goal   (Resolved)     OT, PT/OT Outcome(s) achieved    Description:  Goals to be met by: 8/3/17     Patient will increase functional independence with ADLs by performing:    LE Dressing with Supervision.  Grooming while standing with Supervision.  Toileting from toilet with Supervision for hygiene and clothing management.   Supine to sit with Supervision.  Stand pivot transfers with Supervision.  Toilet transfer to toilet with Supervision.  Upper extremity exercise program x10 reps per handout, with independence.                    Reasons for Discontinuation of Therapy Services  Transfer to alternate level of care.      Plan:  Patient Discharged to: Home no PT services needed.

## 2017-08-06 NOTE — PLAN OF CARE
08/02/17 1200   Final Note   Assessment Type Final Discharge Note   Discharge Disposition Home   Discharge plans and expectations educations in teach back method with documentation complete? No   Offered [facility] -- Bedside Delivery? Yes   Discharge/Hospital Encounter Summary to (non-[facility]) PCP Yes   Referral to Outpatient Case Management complete? n/a   Referral to / orders for Home Health Complete? n/a   30 day supply of medicines given at discharge, if documented non-compliance / non-adherence? n/a   Any social issues identified prior to discharge? n/a   Right Care Referral Info   Post Acute Recommendation No Care

## 2017-08-06 NOTE — PLAN OF CARE
08/02/17 1154   Discharge Assessment   Assessment Type Discharge Planning Assessment   Confirmed/corrected address and phone number on facesheet? Yes   Assessment information obtained from? Patient   Communicated expected length of stay with patient/caregiver yes   Prior to hospitilization cognitive status: Alert/Oriented   Prior to hospitalization functional status: Independent   Current cognitive status: Alert/Oriented   Current Functional Status: Independent   Arrived From home or self-care   Lives With alone   Able to Return to Prior Arrangements yes   Is patient able to care for self after discharge? Yes   How many people do you have in your home that can help with your care after discharge? 0   Who are your caregiver(s) and their phone number(s)? mother and sister   Patient's perception of discharge disposition home or selfcare   Readmission Within The Last 30 Days no previous admission in last 30 days   Patient currently being followed by outpatient case management? No   Patient currently receives home health services? No   Does the patient currently use HME? Yes   Patient currently receives any other outside agency services? No   Equipment Currently Used at Home walker, rolling;3-in-1 commode   Do you have any problems affording any of your prescribed medications? No   Does the patient have transportation to healthcare appointments? Yes   Transportation Available family or friend will provide   On Dialysis? No   Discharge Plan A Home with family   Discharge Plan B Home;[facility]

## 2017-12-22 ENCOUNTER — OFFICE VISIT (OUTPATIENT)
Dept: URGENT CARE | Facility: CLINIC | Age: 62
End: 2017-12-22
Payer: MEDICAID

## 2017-12-22 VITALS
DIASTOLIC BLOOD PRESSURE: 81 MMHG | OXYGEN SATURATION: 97 % | TEMPERATURE: 98 F | HEART RATE: 76 BPM | BODY MASS INDEX: 30.85 KG/M2 | WEIGHT: 215 LBS | SYSTOLIC BLOOD PRESSURE: 128 MMHG

## 2017-12-22 DIAGNOSIS — J01.40 ACUTE NON-RECURRENT PANSINUSITIS: Primary | ICD-10-CM

## 2017-12-22 PROCEDURE — 99203 OFFICE O/P NEW LOW 30 MIN: CPT | Mod: S$GLB,,, | Performed by: SURGERY

## 2017-12-22 RX ORDER — AMOXICILLIN AND CLAVULANATE POTASSIUM 875; 125 MG/1; MG/1
1 TABLET, FILM COATED ORAL 2 TIMES DAILY
Qty: 14 TABLET | Refills: 0 | Status: SHIPPED | OUTPATIENT
Start: 2017-12-22 | End: 2017-12-29

## 2017-12-22 RX ORDER — PROMETHAZINE HYDROCHLORIDE AND DEXTROMETHORPHAN HYDROBROMIDE 6.25; 15 MG/5ML; MG/5ML
5 SYRUP ORAL EVERY 4 HOURS PRN
Qty: 120 ML | Refills: 0 | Status: SHIPPED | OUTPATIENT
Start: 2017-12-22 | End: 2017-12-29

## 2017-12-22 NOTE — PROGRESS NOTES
Subjective:       Patient ID: Heri Medina is a 62 y.o. male.    Vitals:  weight is 97.5 kg (215 lb). His temperature is 98 °F (36.7 °C). His blood pressure is 128/81 and his pulse is 76. His oxygen saturation is 97%.     Chief Complaint: No chief complaint on file.    Cough   This is a new problem. The current episode started in the past 7 days. The problem has been unchanged. The problem occurs constantly. The cough is productive of sputum. Associated symptoms include headaches, a sore throat and wheezing. Pertinent negatives include no chest pain, chills, ear pain, eye redness, fever, myalgias or shortness of breath. Nothing aggravates the symptoms. He has tried OTC cough suppressant for the symptoms. The treatment provided no relief.     Review of Systems   Constitution: Negative for chills, fever and malaise/fatigue.   HENT: Positive for congestion and sore throat. Negative for ear pain and hoarse voice.    Eyes: Negative for discharge and redness.   Cardiovascular: Negative for chest pain, dyspnea on exertion and leg swelling.   Respiratory: Positive for cough and wheezing. Negative for shortness of breath and sputum production.    Musculoskeletal: Negative for myalgias.   Gastrointestinal: Negative for abdominal pain and nausea.   Neurological: Positive for headaches.       Objective:      Physical Exam   Constitutional: He is oriented to person, place, and time. He appears well-developed and well-nourished. He is cooperative.  Non-toxic appearance. He does not appear ill. No distress.   HENT:   Head: Normocephalic and atraumatic.   Right Ear: Hearing, tympanic membrane, external ear and ear canal normal.   Left Ear: Hearing, tympanic membrane, external ear and ear canal normal.   Nose: Nose normal. No mucosal edema, rhinorrhea or nasal deformity. No epistaxis. Right sinus exhibits no maxillary sinus tenderness and no frontal sinus tenderness. Left sinus exhibits no maxillary sinus tenderness and no frontal  sinus tenderness.   Mouth/Throat: Uvula is midline and mucous membranes are normal. No trismus in the jaw. Normal dentition. No uvula swelling. Posterior oropharyngeal edema and posterior oropharyngeal erythema present.   Purulent postnasal drip   Eyes: Conjunctivae and lids are normal. No scleral icterus.   Sclera clear bilat   Neck: Trachea normal, full passive range of motion without pain and phonation normal. Neck supple.   Cardiovascular: Normal rate, regular rhythm, normal heart sounds, intact distal pulses and normal pulses.    Pulmonary/Chest: Effort normal and breath sounds normal. No respiratory distress.   Frequent productive cough through exam     Abdominal: Soft. Normal appearance and bowel sounds are normal. He exhibits no distension. There is no tenderness.   Musculoskeletal: Normal range of motion. He exhibits no edema or deformity.   Neurological: He is alert and oriented to person, place, and time. He exhibits normal muscle tone. Coordination normal.   Skin: Skin is warm, dry and intact. He is not diaphoretic. No pallor.   Psychiatric: He has a normal mood and affect. His speech is normal and behavior is normal. Judgment and thought content normal. Cognition and memory are normal.   Nursing note and vitals reviewed.      Assessment:       1. Acute non-recurrent pansinusitis        Plan:         Acute non-recurrent pansinusitis  -     amoxicillin-clavulanate 875-125mg (AUGMENTIN) 875-125 mg per tablet; Take 1 tablet by mouth 2 (two) times daily.  Dispense: 14 tablet; Refill: 0  -     promethazine-dextromethorphan (PROMETHAZINE-DM) 6.25-15 mg/5 mL Syrp; Take 5 mLs by mouth every 4 (four) hours as needed.  Dispense: 120 mL; Refill: 0

## 2017-12-22 NOTE — PATIENT INSTRUCTIONS
Sinusitis (Antibiotic Treatment)    The sinuses are air-filled spaces within the bones of the face. They connect to the inside of the nose. Sinusitis is an inflammation of the tissue lining the sinus cavity. Sinus inflammation can occur during a cold. It can also be due to allergies to pollens and other particles in the air. Sinusitis can cause symptoms of sinus congestion and fullness. A sinus infection causes fever, headache and facial pain. There is often green or yellow drainage from the nose or into the back of the throat (post-nasal drip). You have been given antibiotics to treat this condition.  Home care:  · Take the full course of antibiotics as instructed. Do not stop taking them, even if you feel better.  · Drink plenty of water, hot tea, and other liquids. This may help thin mucus. It also may promote sinus drainage.  · Heat may help soothe painful areas of the face. Use a towel soaked in hot water. Or,  the shower and direct the hot spray onto your face. Using a vaporizer along with a menthol rub at night may also help.   · An expectorant containing guaifenesin may help thin the mucus and promote drainage from the sinuses.  · Over-the-counter decongestants may be used unless a similar medicine was prescribed. Nasal sprays work the fastest. Use one that contains phenylephrine or oxymetazoline. First blow the nose gently. Then use the spray. Do not use these medicines more often than directed on the label or symptoms may get worse. You may also use tablets containing pseudoephedrine. Avoid products that combine ingredients, because side effects may be increased. Read labels. You can also ask the pharmacist for help. (NOTE: Persons with high blood pressure should not use decongestants. They can raise blood pressure.)  · Over-the-counter antihistamines may help if allergies contributed to your sinusitis.    · Do not use nasal rinses or irrigation during an acute sinus infection, unless told to by  your health care provider. Rinsing may spread the infection to other sinuses.  · Use acetaminophen or ibuprofen to control pain, unless another pain medicine was prescribed. (If you have chronic liver or kidney disease or ever had a stomach ulcer, talk with your doctor before using these medicines. Aspirin should never be used in anyone under 18 years of age who is ill with a fever. It may cause severe liver damage.)  · Don't smoke. This can worsen symptoms.  Follow-up care  Follow up with your healthcare provider or our staff if you are not improving within the next week.  When to seek medical advice  Call your healthcare provider if any of these occur:  · Facial pain or headache becoming more severe  · Stiff neck  · Unusual drowsiness or confusion  · Swelling of the forehead or eyelids  · Vision problems, including blurred or double vision  · Fever of 100.4ºF (38ºC) or higher, or as directed by your healthcare provider  · Seizure  · Breathing problems  · Symptoms not resolving within 10 days  Date Last Reviewed: 4/13/2015  © 2952-7347 [employer] [address] [place] [address], [place] [address]. All rights reserved. This information is not intended as a substitute for professional medical care. Always follow your healthcare professional's instructions.

## 2017-12-26 ENCOUNTER — OFFICE VISIT (OUTPATIENT)
Dept: URGENT CARE | Facility: CLINIC | Age: 62
End: 2017-12-26
Payer: MEDICAID

## 2017-12-26 VITALS
DIASTOLIC BLOOD PRESSURE: 77 MMHG | RESPIRATION RATE: 14 BRPM | SYSTOLIC BLOOD PRESSURE: 117 MMHG | HEIGHT: 70 IN | WEIGHT: 215 LBS | TEMPERATURE: 97 F | OXYGEN SATURATION: 98 % | BODY MASS INDEX: 30.78 KG/M2 | HEART RATE: 97 BPM

## 2017-12-26 DIAGNOSIS — R05.9 COUGH: Primary | ICD-10-CM

## 2017-12-26 DIAGNOSIS — E13.9 DIABETES 1.5, MANAGED AS TYPE 2: ICD-10-CM

## 2017-12-26 DIAGNOSIS — J01.00 ACUTE MAXILLARY SINUSITIS, RECURRENCE NOT SPECIFIED: ICD-10-CM

## 2017-12-26 LAB
CTP QC/QA: YES
FLUAV AG NPH QL: NEGATIVE
FLUBV AG NPH QL: NEGATIVE
GLUCOSE SERPL-MCNC: NORMAL MG/DL (ref 70–110)

## 2017-12-26 PROCEDURE — 82948 REAGENT STRIP/BLOOD GLUCOSE: CPT | Mod: S$GLB,,, | Performed by: FAMILY MEDICINE

## 2017-12-26 PROCEDURE — 99213 OFFICE O/P EST LOW 20 MIN: CPT | Mod: S$GLB,,, | Performed by: FAMILY MEDICINE

## 2017-12-26 PROCEDURE — 87804 INFLUENZA ASSAY W/OPTIC: CPT | Mod: QW,S$GLB,, | Performed by: FAMILY MEDICINE

## 2017-12-26 RX ORDER — BETAMETHASONE SODIUM PHOSPHATE AND BETAMETHASONE ACETATE 3; 3 MG/ML; MG/ML
6 INJECTION, SUSPENSION INTRA-ARTICULAR; INTRALESIONAL; INTRAMUSCULAR; SOFT TISSUE
Status: COMPLETED | OUTPATIENT
Start: 2017-12-26 | End: 2017-12-26

## 2017-12-26 RX ADMIN — BETAMETHASONE SODIUM PHOSPHATE AND BETAMETHASONE ACETATE 6 MG: 3; 3 INJECTION, SUSPENSION INTRA-ARTICULAR; INTRALESIONAL; INTRAMUSCULAR; SOFT TISSUE at 02:12

## 2017-12-26 NOTE — PATIENT INSTRUCTIONS
Sinusitis (Antibiotic Treatment)    The sinuses are air-filled spaces within the bones of the face. They connect to the inside of the nose. Sinusitis is an inflammation of the tissue lining the sinus cavity. Sinus inflammation can occur during a cold. It can also be due to allergies to pollens and other particles in the air. Sinusitis can cause symptoms of sinus congestion and fullness. A sinus infection causes fever, headache and facial pain. There is often green or yellow drainage from the nose or into the back of the throat (post-nasal drip). You have been given antibiotics to treat this condition.  Home care:  · Take the full course of antibiotics as instructed. Do not stop taking them, even if you feel better.  · Drink plenty of water, hot tea, and other liquids. This may help thin mucus. It also may promote sinus drainage.  · Heat may help soothe painful areas of the face. Use a towel soaked in hot water. Or,  the shower and direct the hot spray onto your face. Using a vaporizer along with a menthol rub at night may also help.   · An expectorant containing guaifenesin may help thin the mucus and promote drainage from the sinuses.  · Over-the-counter decongestants may be used unless a similar medicine was prescribed. Nasal sprays work the fastest. Use one that contains phenylephrine or oxymetazoline. First blow the nose gently. Then use the spray. Do not use these medicines more often than directed on the label or symptoms may get worse. You may also use tablets containing pseudoephedrine. Avoid products that combine ingredients, because side effects may be increased. Read labels. You can also ask the pharmacist for help. (NOTE: Persons with high blood pressure should not use decongestants. They can raise blood pressure.)  · Over-the-counter antihistamines may help if allergies contributed to your sinusitis.    · Do not use nasal rinses or irrigation during an acute sinus infection, unless told to by  your health care provider. Rinsing may spread the infection to other sinuses.  · Use acetaminophen or ibuprofen to control pain, unless another pain medicine was prescribed. (If you have chronic liver or kidney disease or ever had a stomach ulcer, talk with your doctor before using these medicines. Aspirin should never be used in anyone under 18 years of age who is ill with a fever. It may cause severe liver damage.)  · Don't smoke. This can worsen symptoms.  Follow-up care  Follow up with your healthcare provider or our staff if you are not improving within the next week.  When to seek medical advice  Call your healthcare provider if any of these occur:  · Facial pain or headache becoming more severe  · Stiff neck  · Unusual drowsiness or confusion  · Swelling of the forehead or eyelids  · Vision problems, including blurred or double vision  · Fever of 100.4ºF (38ºC) or higher, or as directed by your healthcare provider  · Seizure  · Breathing problems  · Symptoms not resolving within 10 days  Date Last Reviewed: 4/13/2015  © 0189-3036 [employer]. [address] [place] [address], [place] [address]. All rights reserved. This information is not intended as a substitute for professional medical care. Always follow your healthcare professional's instructions.    FINISH THE AUGMENTIN  TAKE MUCINEX-DM TWICE A DAY

## 2017-12-26 NOTE — PROGRESS NOTES
"Subjective:       Patient ID: Heri Medina is a 62 y.o. male.    Vitals:  height is 5' 10" (1.778 m) and weight is 97.5 kg (215 lb). His temperature is 97.3 °F (36.3 °C). His blood pressure is 117/77 and his pulse is 97. His respiration is 14 and oxygen saturation is 98%.     Chief Complaint: Cough    Cough   This is a new problem. The current episode started yesterday. The problem has been gradually worsening. The problem occurs every few minutes. The cough is productive of purulent sputum. Associated symptoms include headaches, myalgias, nasal congestion and postnasal drip. Pertinent negatives include no chest pain, chills, ear pain, eye redness, fever, sore throat, shortness of breath or wheezing. Nothing aggravates the symptoms. He has tried OTC cough suppressant (Augmentum) for the symptoms. The treatment provided mild relief.     Review of Systems   Constitution: Negative for chills, fever and malaise/fatigue.   HENT: Positive for congestion and postnasal drip. Negative for ear pain, hoarse voice and sore throat.    Eyes: Negative for discharge and redness.   Cardiovascular: Negative for chest pain, dyspnea on exertion and leg swelling.   Respiratory: Positive for cough and sputum production. Negative for shortness of breath and wheezing.    Musculoskeletal: Positive for myalgias.   Gastrointestinal: Negative for abdominal pain and nausea.   Neurological: Positive for headaches.       Objective:      Physical Exam   Constitutional: He is oriented to person, place, and time. He appears well-developed and well-nourished. He is cooperative.  Non-toxic appearance. He does not appear ill. No distress.   HENT:   Head: Normocephalic and atraumatic.   Right Ear: Hearing, tympanic membrane, external ear and ear canal normal.   Left Ear: Hearing, tympanic membrane, external ear and ear canal normal.   Nose: No mucosal edema, rhinorrhea or nasal deformity. No epistaxis. Right sinus exhibits maxillary sinus tenderness. " Right sinus exhibits no frontal sinus tenderness. Left sinus exhibits maxillary sinus tenderness. Left sinus exhibits no frontal sinus tenderness.   Mouth/Throat: Uvula is midline and mucous membranes are normal. No trismus in the jaw. Normal dentition. No uvula swelling. Posterior oropharyngeal erythema present.   Eyes: Conjunctivae and lids are normal. No scleral icterus.   Sclera clear bilat   Neck: Trachea normal, full passive range of motion without pain and phonation normal. Neck supple.   Cardiovascular: Normal rate, regular rhythm, normal heart sounds, intact distal pulses and normal pulses.    Pulmonary/Chest: Effort normal and breath sounds normal. No respiratory distress.   Abdominal: Soft. Normal appearance and bowel sounds are normal. He exhibits no distension. There is no tenderness.   Musculoskeletal: Normal range of motion. He exhibits no edema or deformity.   Neurological: He is alert and oriented to person, place, and time. He exhibits normal muscle tone. Coordination normal.   Skin: Skin is warm, dry and intact. He is not diaphoretic. No pallor.   Psychiatric: He has a normal mood and affect. His speech is normal and behavior is normal. Judgment and thought content normal. Cognition and memory are normal.   Nursing note and vitals reviewed.      Assessment:       1. Cough    2. Acute maxillary sinusitis, recurrence not specified    3. Diabetes 1.5, managed as type 2        Plan:         Cough  -     POCT Influenza A/B    Acute maxillary sinusitis, recurrence not specified    Diabetes 1.5, managed as type 2

## 2018-03-15 RX ORDER — LISINOPRIL 5 MG/1
TABLET ORAL
Qty: 30 TABLET | Refills: 3 | OUTPATIENT
Start: 2018-03-15

## 2018-11-16 DIAGNOSIS — R10.9 ABDOMINAL PAIN: Primary | ICD-10-CM

## 2018-11-16 DIAGNOSIS — R10.84 ABDOMINAL PAIN, GENERALIZED: Primary | ICD-10-CM

## 2018-11-21 ENCOUNTER — HOSPITAL ENCOUNTER (OUTPATIENT)
Dept: RADIOLOGY | Facility: HOSPITAL | Age: 63
Discharge: HOME | End: 2018-11-21
Attending: INTERNAL MEDICINE
Payer: MEDICAID

## 2018-11-21 DIAGNOSIS — R10.9 ABDOMINAL PAIN: ICD-10-CM

## 2018-11-21 DIAGNOSIS — R10.84 ABDOMINAL PAIN, GENERALIZED: ICD-10-CM

## 2018-11-21 PROCEDURE — 74018 RADEX ABDOMEN 1 VIEW: CPT | Mod: TC,FY

## 2018-11-21 PROCEDURE — 74018 RADEX ABDOMEN 1 VIEW: CPT | Mod: 26,,, | Performed by: RADIOLOGY

## 2018-11-21 PROCEDURE — 76700 US EXAM ABDOM COMPLETE: CPT | Mod: 26,,, | Performed by: RADIOLOGY

## 2018-11-21 PROCEDURE — 76700 US EXAM ABDOM COMPLETE: CPT | Mod: TC

## 2018-12-03 PROBLEM — K80.20 CALCULUS OF GALLBLADDER WITHOUT CHOLECYSTITIS WITHOUT OBSTRUCTION: Status: ACTIVE | Noted: 2018-12-03

## 2018-12-03 PROBLEM — K27.9 PEPTIC ULCER: Status: ACTIVE | Noted: 2018-12-03

## 2019-07-23 ENCOUNTER — HOSPITAL ENCOUNTER (OUTPATIENT)
Dept: PREADMISSION TESTING | Facility: HOSPITAL | Age: 64
Discharge: HOME | End: 2019-07-23
Attending: SURGERY
Payer: MEDICAID

## 2019-07-23 ENCOUNTER — ANESTHESIA EVENT (OUTPATIENT)
Dept: SURGERY | Facility: HOSPITAL | Age: 64
End: 2019-07-23
Payer: MEDICAID

## 2019-07-23 VITALS
HEART RATE: 55 BPM | WEIGHT: 208.88 LBS | DIASTOLIC BLOOD PRESSURE: 71 MMHG | SYSTOLIC BLOOD PRESSURE: 131 MMHG | OXYGEN SATURATION: 95 % | RESPIRATION RATE: 18 BRPM | TEMPERATURE: 98 F | BODY MASS INDEX: 29.9 KG/M2 | HEIGHT: 70 IN

## 2019-07-23 DIAGNOSIS — Z01.818 PREOP EXAMINATION: Primary | ICD-10-CM

## 2019-07-23 RX ORDER — LIDOCAINE HYDROCHLORIDE 10 MG/ML
1 INJECTION, SOLUTION EPIDURAL; INFILTRATION; INTRACAUDAL; PERINEURAL ONCE
Status: CANCELLED | OUTPATIENT
Start: 2019-07-23 | End: 2019-07-23

## 2019-07-23 RX ORDER — SODIUM CHLORIDE, SODIUM LACTATE, POTASSIUM CHLORIDE, CALCIUM CHLORIDE 600; 310; 30; 20 MG/100ML; MG/100ML; MG/100ML; MG/100ML
INJECTION, SOLUTION INTRAVENOUS CONTINUOUS
Status: CANCELLED | OUTPATIENT
Start: 2019-07-23

## 2019-07-23 NOTE — ANESTHESIA PREPROCEDURE EVALUATION
07/23/2019  Heri Medina Jr. is a 63 y.o., male scheduled for laparoscopic cholecystectomy on 7/26/19.    Past Medical History:   Diagnosis Date    Diabetes mellitus     Pre diabetic    Hyperlipidemia     Hypertension     Hypertension associated with diabetes 8/2/2017       Past Surgical History:   Procedure Laterality Date    APPENDECTOMY  1984    ARTHROPLASTY-HIP (ANTERIOR APPROACH) Right 8/1/2017    Performed by Paulie Jules MD at [facility] OR    [facility]  1978    scrotum       Anesthesia Evaluation    I have reviewed the Patient Summary Reports.    I have reviewed the Nursing Notes.   I have reviewed the Medications.     Review of Systems  Anesthesia Hx:  No problems with previous Anesthesia  Denies Family Hx of Anesthesia complications.    Social:  No Alcohol Use Quit smoking in 2005     Hematology/Oncology:  Hematology Normal   Oncology Normal     EENT/Dental:EENT/Dental Normal   Cardiovascular:   Hypertension, well controlled  Denies Angina. hyperlipidemia      Functional Capacity good / => 4 METS, weightlifting, walking every other day    Pulmonary:   Sleep Apnea, CPAP    Renal/:  Renal/ Normal     Hepatic/GI:  Hepatic/GI Normal    Musculoskeletal:   Arthritis     Neurological:  Neurology Normal    Endocrine:   Diabetes, well controlled, type 2    Psych:  Psychiatric Normal           Physical Exam  General:  Obesity    Airway/Jaw/Neck:  Airway Findings: Mouth Opening: Normal Tongue: Normal  General Airway Assessment: Adult  Mallampati: III  Improves to II with phonation.  TM Distance: Normal, at least 6 cm  Jaw/Neck Findings:  Neck ROM: Normal ROM      Dental:  Dental Findings: In tact   Chest/Lungs:  Chest/Lungs Findings: Clear to auscultation, Normal Respiratory Rate     Heart/Vascular:  Heart Findings: Rate: Bradycardia  Rhythm: Regular Rhythm  Sounds: Normal  Heart murmur: negative        Mental Status:  Mental Status Findings:  Cooperative, Alert and Oriented         Anesthesia Plan  Type of Anesthesia, risks & benefits discussed:  Anesthesia Type:  general  Patient's Preference:   Intra-op Monitoring Plan:   Intra-op Monitoring Plan Comments:   Post Op Pain Control Plan:   Post Op Pain Control Plan Comments:   Induction:   IV  Beta Blocker:         Informed Consent:    ASA Score: 2     Day of Surgery Review of History & Physical:        Anesthesia Plan Notes: Anesthesia consent to be signed prior to procedure on 7/26/19          Ready For Surgery From Anesthesia Perspective.

## 2019-07-23 NOTE — PRE-PROCEDURE INSTRUCTIONS
Matteo Victoria, [phone]    Allergies, medical, surgical, family and psychosocial histories reviewed with patient. Periop plan of care reviewed. Preop instructions given, including medications to take and to hold. Hibiclens soap and instructions on use given. Time allotted for questions to be addressed.  Patient verbalized understanding.

## 2019-07-23 NOTE — DISCHARGE INSTRUCTIONS
Your surgery is scheduled for 7/26.    Please report to Outpatient Surgery Intake Office on the 2nd FLOOR at 1030a.m.          INSTRUCTIONS IMPORTANT!!!  ¨ Do not eat or drink after 12 midnight-including water. OK to brush teeth, no   gum, candy or mints!          ____  Proceed to [facility] on *** for additional blood test.        ____  Do not wear makeup, including mascara.  ____  No powder, lotions or creams to surgical area.  ____  Please remove all jewelry, including piercings and leave at home.  ____  No money or valuables needed. Please leave at home.  ____  Please bring any documents given by your doctor.  ____  If going home the same day, arrange for a ride home. You will not be able to             drive if Anesthesia was used.  ____  Children under 18 years require a parent / guardian present the entire time             they are in surgery / recovery.  ____  Wear loose fitting clothing. Allow for dressings, bandages.  ____  Stop Aspirin, Ibuprofen, Motrin and Aleve at least 3-5 days before surgery, unless otherwise instructed by your doctor, or the nurse.   You MAY use Tylenol/acetaminophen until day of surgery.  ____  Wash the surgical area with Hibiclens the night before surgery, and again the             morning of surgery.  Be sure to rinse hibiclens off completely (if instructed by   nurse).  ____  If you take diabetic medication, do not take am of surgery unless instructed by Doctor.  ____  Call MD for temperature above 101 degrees or any other signs of infection such as Urinary (bladder) infection, Upper respiratory infection, skin boils, etc.  ____ Stop taking any Fish Oil supplement or any Vitamins that contain Vitamin E at least 5 days prior to surgery.  ____ Do Not wear your contact lenses the day of your procedure.  You may wear your glasses.      ____Do not shave surgical site for 3 days prior to surgery.  ____ Practice Good hand washing before, during, and after procedure.       I have read or had read and explained to me, and understand the above information.  Additional comments or instructions:  For additional questions call [phone]      ANESTHESIA SIDE EFFECTS  -For the first 24 hours after surgery:  Do not drive, use heavy equipment, make important decisions, or drink alcohol  -It is normal to feel sleepy for several hours.  Rest until you are more awake.  -Have someone stay with you, if needed.  They can watch for problems and help keep you safe.  -Some possible post anesthesia side effects include: nausea and vomiting, sore throat and hoarseness, sleepiness, and dizziness.        Pre-Op Bathing Instructions    Before surgery, you can play an important role in your own health.    Because skin is not sterile, we need to be sure that your skin is as free of germs as possible. By following the instructions below, you can reduce the number of germs on your skin before surgery.    IMPORTANT: You will need to shower with a special soap called Hibiclens*, available at any pharmacy.  If you are allergic to Chlorhexidine (the antiseptic in Hibiclens), use an antibacterial soap such as Dial Soap for your preoperative shower.  You will shower with Hibiclens both the night before your surgery and the morning of your surgery.  Do not use Hibiclens on the head, face or genitals to avoid injury to those areas.    STEP #1: THE NIGHT BEFORE YOUR SURGERY     1. Do not shave the area of your body where your surgery will be performed.  2. Shower and wash your hair and body as usual with your normal soap and shampoo.  3. Rinse your hair and body thoroughly after you shower to remove all soap residue.  4. With your hand, apply one packet of Hibiclens soap to the surgical site.   5. Wash the site gently for five (5) minutes. Do not scrub your skin too hard.   6. Do not wash with your regular soap after Hibiclens is used.  7. Rinse your body thoroughly.  8. Pat yourself dry with a clean, soft  towel.  9. Do not use lotion, cream, or powder.  10. Wear clean clothes.    STEP #2: THE MORNING OF YOUR SURGERY     1. Repeat Step #1.    * Not to be used by people allergic to Chlorhexidine.      Discharge Instructions for Laparoscopic Cholecystectomy  You have had a procedure known as a laparoscopic cholecystectomy. A laparoscopic cholecystectomy is a procedure to remove your gallbladder. People who have this procedure usually recover more quickly and have less pain than with open gallbladder surgery (called open cholecystectomy). Many surgeons recommend a low-fat diet, avoiding fried food in particular, for the first month after surgery.   You can live a full and healthy life without your gallbladder. This includes eating the foods and doing the things you enjoyed before your gallbladder problems started.  Home care  Recommendations for home care include the following:   · Ask someone to drive you to your appointments for the next 3 days. Dont drive until you are no longer taking pain medicine and are able to step on the brake pedal without hesitation.   · Wash the skin around your incision daily with mild soap and water. It's OK to shower the day after your surgery.  · Eat your regular diet. It is wise to stay away from rich, greasy, or spicy food for a few days.  · Remember, it takes at least 1 week for you to get most of your strength and energy back.  · Make an office visit to talk to your healthcare provider if the following symptoms dont go away within a week after your surgery:  ¨ Fatigue  ¨ Pain around the incision  ¨ Diarrhea or constipation  ¨ Loss of appetite     When to call your healthcare provider  Call your healthcare provider immediately if you have any of the following:  · Yellowing of your eyes or skin (jaundice)  · Chills  · Fever of 100.4°F (38.0°C) or higher, or as directed by your healthcare provider   · Redness, swelling, increasing pain, pus, or a foul smell at the incision site  · Dark  or rust-colored urine  · Stool that is moshe-colored or light in color instead of brown  · Increasing belly pain  · Rectal bleeding  · Leg swelling or shortness of breath   Date Last Reviewed: 7/1/2016 © 2000-2017 [employer]. [address] [place] [address]. All rights reserved. This information is not intended as a substitute for professional medical care. Always follow your healthcare professional's instructions.        Anesthesia: General Anesthesia     You are watched continuously during your procedure by your anesthesia provider.     Youre due to have surgery. During surgery, youll be given medicine called anesthesia or anesthetic. This will keep you comfortable and pain-free. Your anesthesia provider will use general anesthesia.  What is general anesthesia?  General anesthesia puts you into a state like deep sleep. It goes into the bloodstream (IV anesthetics), into the lungs (gas anesthetics), or both. You feel nothing during the procedure. You will not remember it. During the procedure, the anesthesia provider monitors you continuously. He or she checks your heart rate and rhythm, blood pressure, breathing, and blood oxygen.  · IV anesthetics. IV anesthetics are given through an IV line in your arm. Theyre often given first. This is so you are asleep before a gas anesthetic is started. Some kinds of IV anesthetics relieve pain. Others relax you. Your doctor will decide which kind is best in your case.  · Gas anesthetics. Gas anesthetics are breathed into the lungs. They are often used to keep you asleep. They can be given through a facemask or a tube placed in your larynx or trachea (breathing tube).  ¨ If you have a facemask, your anesthesia provider will most likely place it over your nose and mouth while youre still awake. Youll breathe oxygen through the mask as your IV anesthetic is started. Gas anesthetic may be added through the mask.  ¨ If you have a tube in the larynx  or trachea, it will be inserted into your throat after youre asleep.  Anesthesia tools and medicines  You will likely have:  · IV anesthetics. These are put into an IV line into your bloodstream.  · Gas anesthetics. You breathe these anesthetics into your lungs, where they pass into your bloodstream.  · Pulse oximeter. This is a small clip that is attached to the end of your finger. This measures your blood oxygen level.  · Electrocardiography leads (electrodes). These are small sticky pads that are placed on your chest. They record your heart rate and rhythm.  · Blood pressure cuff. This reads your blood pressure.  Risks and possible complications  General anesthesia has some risks. These include:  · Breathing problems  · Nausea and vomiting  · Sore throat or hoarseness (usually temporary)  · Allergic reaction to the anesthetic  · Irregular heartbeat (rare)  · Cardiac arrest (rare)   Anesthesia safety  · Follow all instructions you are given for how long not to eat or drink before your procedure.  · Be sure your doctor knows what medicines and drugs you take. This includes over-the-counter medicines, herbs, supplements, alcohol or other drugs. You will be asked when those were last taken.  · Have an adult family member or friend drive you home after the procedure.  · For the first 24 hours after your surgery:  ¨ Do not drive or use heavy equipment.  ¨ Do not make important decisions or sign legal documents. If important decisions or signing legal documents is necessary during the first 24 hours after surgery, have a trusted family member or spouse act on your behalf.  ¨ Avoid alcohol.  ¨ Have a responsible adult stay with you. He or she can watch for problems and help keep you safe.  Date Last Reviewed: 12/1/2016 © 2000-2017 [employer]. [address] [place] [address], [place] [address]. All rights reserved. This information is not intended as a substitute for professional medical care. Always follow your  healthcare professional's instructions.

## 2019-07-26 ENCOUNTER — HOSPITAL ENCOUNTER (OUTPATIENT)
Facility: HOSPITAL | Age: 64
Discharge: HOME | End: 2019-07-26
Attending: SURGERY | Admitting: SURGERY
Payer: MEDICAID

## 2019-07-26 ENCOUNTER — ANESTHESIA (OUTPATIENT)
Dept: SURGERY | Facility: HOSPITAL | Age: 64
End: 2019-07-26
Payer: MEDICAID

## 2019-07-26 VITALS
RESPIRATION RATE: 20 BRPM | TEMPERATURE: 98 F | OXYGEN SATURATION: 96 % | DIASTOLIC BLOOD PRESSURE: 75 MMHG | WEIGHT: 208 LBS | BODY MASS INDEX: 29.78 KG/M2 | HEIGHT: 70 IN | SYSTOLIC BLOOD PRESSURE: 134 MMHG | HEART RATE: 69 BPM

## 2019-07-26 DIAGNOSIS — M25.559 ARTHRALGIA OF HIP, UNSPECIFIED LATERALITY: Primary | ICD-10-CM

## 2019-07-26 DIAGNOSIS — K80.20 CALCULUS OF GALLBLADDER WITHOUT CHOLECYSTITIS WITHOUT OBSTRUCTION: ICD-10-CM

## 2019-07-26 DIAGNOSIS — I15.2 HYPERTENSION ASSOCIATED WITH DIABETES: ICD-10-CM

## 2019-07-26 DIAGNOSIS — E11.59 HYPERTENSION ASSOCIATED WITH DIABETES: ICD-10-CM

## 2019-07-26 DIAGNOSIS — E11.69 HYPERLIPIDEMIA ASSOCIATED WITH TYPE 2 DIABETES MELLITUS: ICD-10-CM

## 2019-07-26 DIAGNOSIS — E78.5 HYPERLIPIDEMIA ASSOCIATED WITH TYPE 2 DIABETES MELLITUS: ICD-10-CM

## 2019-07-26 DIAGNOSIS — K27.9 PEPTIC ULCER: ICD-10-CM

## 2019-07-26 LAB — POCT GLUCOSE: 105 MG/DL (ref 70–110)

## 2019-07-26 PROCEDURE — 71000033 HC RECOVERY, INTIAL HOUR: Performed by: SURGERY

## 2019-07-26 PROCEDURE — 88304 TISSUE SPECIMEN TO PATHOLOGY - SURGERY: ICD-10-PCS | Mod: 26,,, | Performed by: PATHOLOGY

## 2019-07-26 PROCEDURE — 25000003 PHARM REV CODE 250: Performed by: SURGERY

## 2019-07-26 PROCEDURE — 25000003 PHARM REV CODE 250

## 2019-07-26 PROCEDURE — 88304 TISSUE EXAM BY PATHOLOGIST: CPT | Performed by: PATHOLOGY

## 2019-07-26 PROCEDURE — 37000008 HC ANESTHESIA 1ST 15 MINUTES: Performed by: SURGERY

## 2019-07-26 PROCEDURE — 63600175 PHARM REV CODE 636 W HCPCS: Performed by: NURSE PRACTITIONER

## 2019-07-26 PROCEDURE — 71000015 HC POSTOP RECOV 1ST HR: Performed by: SURGERY

## 2019-07-26 PROCEDURE — 00790 ANES IPER UPR ABD NOS: CPT | Performed by: SURGERY

## 2019-07-26 PROCEDURE — 71000039 HC RECOVERY, EACH ADD'L HOUR: Performed by: SURGERY

## 2019-07-26 PROCEDURE — S0020 INJECTION, BUPIVICAINE HYDRO: HCPCS | Performed by: SURGERY

## 2019-07-26 PROCEDURE — 37000009 HC ANESTHESIA EA ADD 15 MINS: Performed by: SURGERY

## 2019-07-26 PROCEDURE — 63600175 PHARM REV CODE 636 W HCPCS: Performed by: ANESTHESIOLOGY

## 2019-07-26 PROCEDURE — 27201423 OPTIME MED/SURG SUP & DEVICES STERILE SUPPLY: Performed by: SURGERY

## 2019-07-26 PROCEDURE — 63600175 PHARM REV CODE 636 W HCPCS: Performed by: NURSE ANESTHETIST, CERTIFIED REGISTERED

## 2019-07-26 PROCEDURE — 25000003 PHARM REV CODE 250: Performed by: NURSE ANESTHETIST, CERTIFIED REGISTERED

## 2019-07-26 PROCEDURE — 88304 TISSUE EXAM BY PATHOLOGIST: CPT | Mod: 26,,, | Performed by: PATHOLOGY

## 2019-07-26 PROCEDURE — 71000016 HC POSTOP RECOV ADDL HR: Performed by: SURGERY

## 2019-07-26 PROCEDURE — 36000709 HC OR TIME LEV III EA ADD 15 MIN: Performed by: SURGERY

## 2019-07-26 PROCEDURE — 36000708 HC OR TIME LEV III 1ST 15 MIN: Performed by: SURGERY

## 2019-07-26 RX ORDER — LIDOCAINE HYDROCHLORIDE 10 MG/ML
1 INJECTION, SOLUTION EPIDURAL; INFILTRATION; INTRACAUDAL; PERINEURAL ONCE
Status: DISCONTINUED | OUTPATIENT
Start: 2019-07-26 | End: 2019-07-26 | Stop reason: HOSPADM

## 2019-07-26 RX ORDER — ROCURONIUM BROMIDE 10 MG/ML
INJECTION, SOLUTION INTRAVENOUS
Status: DISCONTINUED | OUTPATIENT
Start: 2019-07-26 | End: 2019-07-26

## 2019-07-26 RX ORDER — FENTANYL CITRATE 50 UG/ML
INJECTION, SOLUTION INTRAMUSCULAR; INTRAVENOUS
Status: DISCONTINUED | OUTPATIENT
Start: 2019-07-26 | End: 2019-07-26

## 2019-07-26 RX ORDER — SODIUM CHLORIDE 9 MG/ML
INJECTION, SOLUTION INTRAVENOUS CONTINUOUS
Status: DISCONTINUED | OUTPATIENT
Start: 2019-07-26 | End: 2019-07-26 | Stop reason: HOSPADM

## 2019-07-26 RX ORDER — ONDANSETRON 8 MG/1
TABLET, ORALLY DISINTEGRATING ORAL
Status: COMPLETED
Start: 2019-07-26 | End: 2019-07-26

## 2019-07-26 RX ORDER — NEOSTIGMINE METHYLSULFATE 1 MG/ML
INJECTION, SOLUTION INTRAVENOUS
Status: DISCONTINUED | OUTPATIENT
Start: 2019-07-26 | End: 2019-07-26

## 2019-07-26 RX ORDER — SODIUM CHLORIDE, SODIUM LACTATE, POTASSIUM CHLORIDE, CALCIUM CHLORIDE 600; 310; 30; 20 MG/100ML; MG/100ML; MG/100ML; MG/100ML
INJECTION, SOLUTION INTRAVENOUS CONTINUOUS
Status: DISCONTINUED | OUTPATIENT
Start: 2019-07-26 | End: 2019-07-26 | Stop reason: HOSPADM

## 2019-07-26 RX ORDER — SODIUM CHLORIDE 0.9 % (FLUSH) 0.9 %
10 SYRINGE (ML) INJECTION
Status: DISCONTINUED | OUTPATIENT
Start: 2019-07-26 | End: 2019-07-26 | Stop reason: HOSPADM

## 2019-07-26 RX ORDER — ACETAMINOPHEN 325 MG/1
650 TABLET ORAL EVERY 4 HOURS PRN
Status: DISCONTINUED | OUTPATIENT
Start: 2019-07-26 | End: 2019-07-26 | Stop reason: HOSPADM

## 2019-07-26 RX ORDER — HYDROCODONE BITARTRATE AND ACETAMINOPHEN 5; 325 MG/1; MG/1
1 TABLET ORAL EVERY 6 HOURS PRN
Qty: 24 TABLET | Refills: 0 | Status: SHIPPED | OUTPATIENT
Start: 2019-07-26 | End: 2019-09-05

## 2019-07-26 RX ORDER — LIDOCAINE HCL/PF 100 MG/5ML
SYRINGE (ML) INTRAVENOUS
Status: DISCONTINUED | OUTPATIENT
Start: 2019-07-26 | End: 2019-07-26

## 2019-07-26 RX ORDER — ONDANSETRON 2 MG/ML
INJECTION INTRAMUSCULAR; INTRAVENOUS
Status: DISCONTINUED | OUTPATIENT
Start: 2019-07-26 | End: 2019-07-26

## 2019-07-26 RX ORDER — ACETAMINOPHEN 10 MG/ML
INJECTION, SOLUTION INTRAVENOUS
Status: DISCONTINUED | OUTPATIENT
Start: 2019-07-26 | End: 2019-07-26

## 2019-07-26 RX ORDER — CEFAZOLIN SODIUM 2 G/50ML
2 SOLUTION INTRAVENOUS
Status: DISCONTINUED | OUTPATIENT
Start: 2019-07-26 | End: 2019-07-26 | Stop reason: HOSPADM

## 2019-07-26 RX ORDER — EPHEDRINE SULFATE 50 MG/ML
INJECTION, SOLUTION INTRAVENOUS
Status: DISCONTINUED | OUTPATIENT
Start: 2019-07-26 | End: 2019-07-26

## 2019-07-26 RX ORDER — BUPIVACAINE HYDROCHLORIDE 2.5 MG/ML
INJECTION, SOLUTION INFILTRATION; PERINEURAL
Status: DISCONTINUED | OUTPATIENT
Start: 2019-07-26 | End: 2019-07-26 | Stop reason: HOSPADM

## 2019-07-26 RX ORDER — ONDANSETRON 8 MG/1
8 TABLET, ORALLY DISINTEGRATING ORAL ONCE
Status: DISCONTINUED | OUTPATIENT
Start: 2019-07-26 | End: 2019-07-26 | Stop reason: HOSPADM

## 2019-07-26 RX ORDER — HYDROCODONE BITARTRATE AND ACETAMINOPHEN 5; 325 MG/1; MG/1
1 TABLET ORAL EVERY 4 HOURS PRN
Status: DISCONTINUED | OUTPATIENT
Start: 2019-07-26 | End: 2019-07-26 | Stop reason: HOSPADM

## 2019-07-26 RX ORDER — PHENYLEPHRINE HYDROCHLORIDE 10 MG/ML
INJECTION INTRAVENOUS
Status: DISCONTINUED | OUTPATIENT
Start: 2019-07-26 | End: 2019-07-26

## 2019-07-26 RX ORDER — SUCCINYLCHOLINE CHLORIDE 20 MG/ML
INJECTION INTRAMUSCULAR; INTRAVENOUS
Status: DISCONTINUED | OUTPATIENT
Start: 2019-07-26 | End: 2019-07-26

## 2019-07-26 RX ORDER — KETOROLAC TROMETHAMINE 30 MG/ML
INJECTION, SOLUTION INTRAMUSCULAR; INTRAVENOUS
Status: DISCONTINUED | OUTPATIENT
Start: 2019-07-26 | End: 2019-07-26

## 2019-07-26 RX ORDER — ONDANSETRON 2 MG/ML
4 INJECTION INTRAMUSCULAR; INTRAVENOUS ONCE AS NEEDED
Status: DISCONTINUED | OUTPATIENT
Start: 2019-07-26 | End: 2019-07-26 | Stop reason: HOSPADM

## 2019-07-26 RX ORDER — MIDAZOLAM HYDROCHLORIDE 1 MG/ML
INJECTION, SOLUTION INTRAMUSCULAR; INTRAVENOUS
Status: DISCONTINUED | OUTPATIENT
Start: 2019-07-26 | End: 2019-07-26

## 2019-07-26 RX ORDER — CEFAZOLIN SODIUM 1 G/3ML
INJECTION, POWDER, FOR SOLUTION INTRAMUSCULAR; INTRAVENOUS
Status: DISCONTINUED | OUTPATIENT
Start: 2019-07-26 | End: 2019-07-26

## 2019-07-26 RX ORDER — GLYCOPYRROLATE 0.2 MG/ML
INJECTION INTRAMUSCULAR; INTRAVENOUS
Status: DISCONTINUED | OUTPATIENT
Start: 2019-07-26 | End: 2019-07-26

## 2019-07-26 RX ORDER — HYDROMORPHONE HYDROCHLORIDE 2 MG/ML
0.5 INJECTION, SOLUTION INTRAMUSCULAR; INTRAVENOUS; SUBCUTANEOUS EVERY 5 MIN PRN
Status: DISCONTINUED | OUTPATIENT
Start: 2019-07-26 | End: 2019-07-26 | Stop reason: HOSPADM

## 2019-07-26 RX ORDER — PROPOFOL 10 MG/ML
VIAL (ML) INTRAVENOUS
Status: DISCONTINUED | OUTPATIENT
Start: 2019-07-26 | End: 2019-07-26

## 2019-07-26 RX ADMIN — SODIUM CHLORIDE, SODIUM LACTATE, POTASSIUM CHLORIDE, AND CALCIUM CHLORIDE: .6; .31; .03; .02 INJECTION, SOLUTION INTRAVENOUS at 12:07

## 2019-07-26 RX ADMIN — PROPOFOL 100 MG: 10 INJECTION, EMULSION INTRAVENOUS at 12:07

## 2019-07-26 RX ADMIN — NEOSTIGMINE METHYLSULFATE 3 MG: 1 INJECTION INTRAVENOUS at 01:07

## 2019-07-26 RX ADMIN — MIDAZOLAM 2 MG: 1 INJECTION INTRAMUSCULAR; INTRAVENOUS at 12:07

## 2019-07-26 RX ADMIN — ONDANSETRON 8 MG: 2 INJECTION, SOLUTION INTRAMUSCULAR; INTRAVENOUS at 01:07

## 2019-07-26 RX ADMIN — EPHEDRINE SULFATE 5 MG: 50 INJECTION, SOLUTION INTRAMUSCULAR; INTRAVENOUS; SUBCUTANEOUS at 12:07

## 2019-07-26 RX ADMIN — ROCURONIUM BROMIDE 5 MG: 10 INJECTION, SOLUTION INTRAVENOUS at 12:07

## 2019-07-26 RX ADMIN — HYDROMORPHONE HYDROCHLORIDE 0.5 MG: 2 INJECTION, SOLUTION INTRAMUSCULAR; INTRAVENOUS; SUBCUTANEOUS at 02:07

## 2019-07-26 RX ADMIN — ACETAMINOPHEN 1000 MG: 10 INJECTION, SOLUTION INTRAVENOUS at 01:07

## 2019-07-26 RX ADMIN — PHENYLEPHRINE HYDROCHLORIDE 100 MCG: 10 INJECTION INTRAVENOUS at 12:07

## 2019-07-26 RX ADMIN — FENTANYL CITRATE 100 MCG: 50 INJECTION, SOLUTION INTRAMUSCULAR; INTRAVENOUS at 01:07

## 2019-07-26 RX ADMIN — ONDANSETRON 8 MG: 8 TABLET, ORALLY DISINTEGRATING ORAL at 04:07

## 2019-07-26 RX ADMIN — GLYCOPYRROLATE 0.4 MG: 0.2 INJECTION, SOLUTION INTRAMUSCULAR; INTRAVENOUS at 01:07

## 2019-07-26 RX ADMIN — FENTANYL CITRATE 50 MCG: 50 INJECTION, SOLUTION INTRAMUSCULAR; INTRAVENOUS at 01:07

## 2019-07-26 RX ADMIN — LIDOCAINE HYDROCHLORIDE 100 MG: 20 INJECTION, SOLUTION INTRAVENOUS at 12:07

## 2019-07-26 RX ADMIN — KETOROLAC TROMETHAMINE 30 MG: 30 INJECTION, SOLUTION INTRAMUSCULAR; INTRAVENOUS at 01:07

## 2019-07-26 RX ADMIN — SUCCINYLCHOLINE CHLORIDE 120 MG: 20 INJECTION, SOLUTION INTRAMUSCULAR; INTRAVENOUS at 12:07

## 2019-07-26 RX ADMIN — FENTANYL CITRATE 100 MCG: 50 INJECTION, SOLUTION INTRAMUSCULAR; INTRAVENOUS at 12:07

## 2019-07-26 RX ADMIN — ROCURONIUM BROMIDE 20 MG: 10 INJECTION, SOLUTION INTRAVENOUS at 12:07

## 2019-07-26 RX ADMIN — CEFAZOLIN 2 G: 330 INJECTION, POWDER, FOR SOLUTION INTRAMUSCULAR; INTRAVENOUS at 12:07

## 2019-07-26 NOTE — PLAN OF CARE
Discharge criteria met,voicing desire to go home. Discharge instructions given to patient & brother; verbalized understanding. Discharge home via wheelchair in care of brother.

## 2019-07-26 NOTE — PLAN OF CARE
Patient has met PACU discharge criteria, VSS, pain well controlled. Family updated by phone. Released from PACU by Dr. Dasilva  *

## 2019-07-26 NOTE — DISCHARGE INSTRUCTIONS
Discharge Instructions for Laparoscopic Cholecystectomy  You have had a procedure known as a laparoscopic cholecystectomy. A laparoscopic cholecystectomy is a procedure to remove your gallbladder. People who have this procedure usually recover more quickly and have less pain than with open gallbladder surgery (called open cholecystectomy). Many surgeons recommend a low-fat diet, avoiding fried food in particular, for the first month after surgery.   You can live a full and healthy life without your gallbladder. This includes eating the foods and doing the things you enjoyed before your gallbladder problems started.  Home care  Recommendations for home care include the following:   · Ask someone to drive you to your appointments for the next 3 days. Dont drive until you are no longer taking pain medicine and are able to step on the brake pedal without hesitation.   · Wash the skin around your incision daily with mild soap and water. It's OK to shower the day after your surgery.  · Eat your regular diet. It is wise to stay away from rich, greasy, or spicy food for a few days.  · Remember, it takes at least 1 week for you to get most of your strength and energy back.  · Make an office visit to talk to your healthcare provider if the following symptoms dont go away within a week after your surgery:  ¨ Fatigue  ¨ Pain around the incision  ¨ Diarrhea or constipation  ¨ Loss of appetite     When to call your healthcare provider  Call your healthcare provider immediately if you have any of the following:  · Yellowing of your eyes or skin (jaundice)  · Chills  · Fever of 100.4°F (38.0°C) or higher, or as directed by your healthcare provider   · Redness, swelling, increasing pain, pus, or a foul smell at the incision site  · Dark or rust-colored urine  · Stool that is moshe-colored or light in color instead of brown  · Increasing belly pain  · Rectal bleeding  · Leg swelling or shortness of breath   Date Last Reviewed:  7/1/2016  © 5430-5798 [employer]. [address] [place] [address], [place] [address]. All rights reserved. This information is not intended as a substitute for professional medical care. Always follow your healthcare professional's instructions.  ANESTHESIA  -For the first 24 hours after surgery:  Do not drive, use heavy equipment, make important decisions, or drink alcohol  -It is normal to feel sleepy for several hours.  Rest until you are more awake.  -Have someone stay with you, if needed.  They can watch for problems and help keep you safe.  -Some possible post anesthesia side effects include: nausea and vomiting, sore throat and hoarseness, sleepiness, and dizziness.    PAIN  -If you have pain after surgery, pain medicine will help you feel better.  Take it as directed, before pain becomes severe.  Most pain relievers taken by mouth need at least 20-30 minutes to start working.  -Do not drive or drink alcohol while taking pain medicine.  -Pain medication can upset your stomach.  Taking them with a little food may help.  -Other ways to help control pain: elevation, ice, and relaxation  -Call your surgeon if still having unmanageable pain an hour after taking pain medicine.  -Pain medicine can cause constipation.  Taking an over-the counter stool softener while on prescription pain medicine and drinking plenty of fluids can prevent this side effect.  -Call your surgeon if you have severe side effects like: breathing problems, trouble waking up, dizziness, confusion, or severe constipation.    NAUSEA  -Some people have nausea after surgery.  This is often because of anesthesia, pain, pain medicine, or the stress of surgery.  -Do not push yourself to eat.  Start off with clear liquids and soup.  Slowly move to solid foods.  Don't eat fatty, rich, spicy foods at first.  Eat smaller amounts.  -If you develop persistent nausea and vomiting please notify your surgeon immediately.    BLEEDING  -Different types of  surgery require different types of care and dressing changes.  It is important to follow all instructions and advice from your surgeon.  Change dressing as directed.  Call your surgeon for any concerns regarding postop bleeding.    SIGNS OF INFECTION  -Signs of infection include: fever, swelling, drainage, and redness  -Notify your surgeon if you have a fever of 100.4 F (38.0 C) or higher.  -Notify your surgeon if you notice redness, swelling, increased pain, pus, or a foul smell at the incision site.        Acetaminophen; Hydrocodone tablets or capsules  What is this medicine?  ACETAMINOPHEN; HYDROCODONE (a set a NOEMY jacquelyn fen; kerline droe KOE done) is a pain reliever. It is used to treat moderate to severe pain.  How should I use this medicine?  Take this medicine by mouth with a glass of water. Follow the directions on the prescription label. You can take it with or without food. If it upsets your stomach, take it with food. Do not take your medicine more often than directed.  A special MedGuide will be given to you by the pharmacist with each prescription and refill. Be sure to read this information carefully each time.  Talk to your pediatrician regarding the use of this medicine in children. Special care may be needed.  What side effects may I notice from receiving this medicine?  Side effects that you should report to your doctor or health care professional as soon as possible:  · allergic reactions like skin rash, itching or hives, swelling of the face, lips, or tongue  · breathing problems  · confusion  · redness, blistering, peeling or loosening of the skin, including inside the mouth  · signs and symptoms of low blood pressure like dizziness; feeling faint or lightheaded, falls; unusually weak or tired  · trouble passing urine or change in the amount of urine  · yellowing of the eyes or skin  Side effects that usually do not require medical attention (report to your doctor or health care professional if they  continue or are bothersome):  · constipation  · dry mouth  · nausea, vomiting  · tiredness  What may interact with this medicine?  This medicine may interact with the following medications:  · alcohol  · antiviral medicines for HIV or AIDS  · atropine  · antihistamines for allergy, cough and cold  · certain antibiotics like erythromycin, clarithromycin  · certain medicines for anxiety or sleep  · certain medicines for bladder problems like oxybutynin, tolterodine  · certain medicines for depression like amitriptyline, fluoxetine, sertraline  · certain medicines for fungal infections like ketoconazole and itraconazole  · certain medicines for Parkinson's disease like benztropine, trihexyphenidyl  · certain medicines for seizures like carbamazepine, phenobarbital, phenytoin, primidone  · certain medicines for stomach problems like dicyclomine, hyoscyamine  · certain medicines for travel sickness like scopolamine  · general anesthetics like halothane, isoflurane, methoxyflurane, propofol  · ipratropium  · local anesthetics like lidocaine, pramoxine, tetracaine  · MAOIs like Carbex, Eldepryl, Marplan, Nardil, and Parnate  · medicines that relax muscles for surgery  · other medicines with acetaminophen  · other narcotic medicines for pain or cough  · phenothiazines like chlorpromazine, mesoridazine, prochlorperazine, thioridazine  · rifampin  What if I miss a dose?  If you miss a dose, take it as soon as you can. If it is almost time for your next dose, take only that dose. Do not take double or extra doses.  Where should I keep my medicine?  Keep out of the reach of children. This medicine can be abused. Keep your medicine in a safe place to protect it from theft. Do not share this medicine with anyone. Selling or giving away this medicine is dangerous and against the law.  This medicine may cause accidental overdose and death if it taken by other adults, children, or pets. Mix any unused medicine with a substance like  cat litter or coffee grounds. Then throw the medicine away in a sealed container like a sealed bag or a coffee can with a lid. Do not use the medicine after the expiration date.  Store at room temperature between 15 and 30 degrees C (59 and 86 degrees F).  What should I tell my health care provider before I take this medicine?  They need to know if you have any of these conditions:  · brain tumor  · Crohn's disease, inflammatory bowel disease, or ulcerative colitis  · drug abuse or addiction  · head injury  · heart or circulation problems  · if you often drink alcohol  · kidney disease or problems going to the bathroom  · liver disease  · lung disease, asthma, or breathing problems  · an unusual or allergic reaction to acetaminophen, hydrocodone, other opioid analgesics, other medicines, foods, dyes, or preservatives  · pregnant or trying to get pregnant  · breast-feeding  What should I watch for while using this medicine?  Tell your doctor or health care professional if your pain does not go away, if it gets worse, or if you have new or a different type of pain. You may develop tolerance to the medicine. Tolerance means that you will need a higher dose of the medicine for pain relief. Tolerance is normal and is expected if you take the medicine for a long time.  Do not suddenly stop taking your medicine because you may develop a severe reaction. Your body becomes used to the medicine. This does NOT mean you are addicted. Addiction is a behavior related to getting and using a drug for a non-medical reason. If you have pain, you have a medical reason to take pain medicine. Your doctor will tell you how much medicine to take. If your doctor wants you to stop the medicine, the dose will be slowly lowered over time to avoid any side effects.  There are different types of narcotic medicines (opiates). If you take more than one type at the same time or if you are taking another medicine that also causes drowsiness, you  may have more side effects. Give your health care provider a list of all medicines you use. Your doctor will tell you how much medicine to take. Do not take more medicine than directed. Call emergency for help if you have problems breathing or unusual sleepiness.  Do not take other medicines that contain acetaminophen with this medicine. Always read labels carefully. If you have questions, ask your doctor or pharmacist.  If you take too much acetaminophen get medical help right away. Too much acetaminophen can be very dangerous and cause liver damage. Even if you do not have symptoms, it is important to get help right away.  You may get drowsy or dizzy. Do not drive, use machinery, or do anything that needs mental alertness until you know how this medicine affects you. Do not stand or sit up quickly, especially if you are an older patient. This reduces the risk of dizzy or fainting spells. Alcohol may interfere with the effect of this medicine. Avoid alcoholic drinks.  The medicine will cause constipation. Try to have a bowel movement at least every 2 to 3 days. If you do not have a bowel movement for 3 days, call your doctor or health care professional.  Your mouth may get dry. Chewing sugarless gum or sucking hard candy, and drinking plenty of water may help. Contact your doctor if the problem does not go away or is severe.  NOTE:This sheet is a summary. It may not cover all possible information. If you have questions about this medicine, talk to your doctor, pharmacist, or health care provider. Copyright© 2017 [employer]

## 2019-07-26 NOTE — ANESTHESIA POSTPROCEDURE EVALUATION
Anesthesia Post Evaluation    Patient: Heri Medina JrMilton    Procedure(s) Performed: Procedure(s) (LRB):  CHOLECYSTECTOMY, LAPAROSCOPIC (N/A)    Final Anesthesia Type: general  Patient location during evaluation: PACU  Patient participation: Yes- Able to Participate  Level of consciousness: awake and alert, oriented and awake  Post-procedure vital signs: reviewed and stable  Pain management: adequate  Airway patency: patent  PONV status at discharge: No PONV  Anesthetic complications: no      Cardiovascular status: blood pressure returned to baseline  Respiratory status: unassisted and room air  Hydration status: euvolemic  Follow-up not needed.          Vitals Value Taken Time   /75 7/26/2019  1:56 PM   Temp 36.7 °C (98.1 °F) 7/26/2019 11:25 AM   Pulse 62 7/26/2019  2:00 PM   Resp 20 7/26/2019  2:00 PM   SpO2 100 % 7/26/2019  2:00 PM   Vitals shown include unvalidated device data.      No case tracking events are documented in the log.      Pain/[place] Score: [place] Score: 10 (7/26/2019 11:26 AM)

## 2019-07-26 NOTE — BRIEF OP NOTE
Operative Note       Surgery Date: 7/26/2019     Surgeon(s) and Role:     * Francesca Zhong MD - Primary    Pre-op Diagnosis:  Hypertension associated with diabetes [E11.59, I10]  Hyperlipidemia associated with type 2 diabetes mellitus [E11.69, E78.5]  Peptic ulcer [K27.9]  Calculus of gallbladder without cholecystitis without obstruction [K80.20]    Post-op Diagnosis: Post-Op Diagnosis Codes:     * Hypertension associated with diabetes [E11.59, I10]     * Hyperlipidemia associated with type 2 diabetes mellitus [E11.69, E78.5]     * Peptic ulcer [K27.9]     * Calculus of gallbladder without cholecystitis without obstruction [K80.20]    Procedure(s) (LRB):  CHOLECYSTECTOMY, LAPAROSCOPIC (N/A)    Anesthesia: General    Procedure in Detail/Findings:  DATE OF PROCEDURE:   7/26/2019     PREOPERATIVE DIAGNOSES:  Cholecystitis, cholelithiasis.     POSTOPERATIVE DIAGNOSES:  Cholecystitis, cholelithiasis.     OPERATION:  Laparoscopic cholecystectomy.     SURGEON:  Francesca Zhong M.D.     ANESTHESIA:  General.     PROCEDURE IN DETAIL:    After satisfactory general endotracheal anesthesia, the patient was positioned,  abdomen was prepped and draped in normal sterile manner using ChloraPrep.  A small supraumbilical incision was made.  Veress needle was introduced.  Abdominal cavity was insufflated using CO2 up to a pressure of 15. A 10-mm trocar cannula was introduced through the umbilical incision and two 5-mm probes, right side of the abdomen and 1 hypogastric area.  After properly positioning the patient, dissection was started at the gallbladder cystic duct junction.  Cystic duct was isolated, doubly hemoclipped and incised.  Then, cystic artery was isolated, doubly hemoclipped and incised.  Gallbladder was then removed from below upwards using electrocautery.  Hemostasis satisfactorily maintained.  Gallbladder was retrieved through the umbilical incision.  Hemostasis satisfactorily maintained.  Abdominal cavity was  thoroughly irrigated with normal saline solution and was adequately desufflated.  All probes were   withdrawn under direct vision.  Closure of all wounds were performed, 0 Vicryl   for the fascia.  Skin was closed using running 4-0 Monocryl.  Incision was   infiltrated using 0.25% Marcaine solution.  Sterile gauze dressing was applied.   The instrument count, sponge count, and needle count was correct.  Estimated   blood loss was 30 mL.  Specimen removed was gallbladder.  The patient was sent   to Recovery Room in stable condition.     Francesca Zhong    Estimated Blood Loss: 30 cc         Specimens (From admission, onward)    Start     Ordered    07/26/19 1249  Specimen to Pathology - Surgery  Once     Start Status     07/26/19 1249 Collected (07/26/19 1332) Order ID: [id number]       07/26/19 1249        Implants: * No implants in log *           Disposition: PACU - hemodynamically stable.           Condition: Good    Attestation:  I performed the procedure.           Discharge Note    Admit Date: 7/26/2019    Attending Physician: Francesca Zhong MD     Discharge Physician: Francesca Zhong MD    Final Diagnosis: Post-Op Diagnosis Codes:     * Hypertension associated with diabetes [E11.59, I10]     * Hyperlipidemia associated with type 2 diabetes mellitus [E11.69, E78.5]     * Peptic ulcer [K27.9]     * Calculus of gallbladder without cholecystitis without obstruction [K80.20]    Disposition: Home or Self Care    Patient Instructions:   Current Discharge Medication List      START taking these medications    Details   HYDROcodone-acetaminophen (NORCO) 5-325 mg per tablet Take 1 tablet by mouth every 6 (six) hours as needed for Pain.  Qty: 24 tablet, Refills: 0         CONTINUE these medications which have NOT CHANGED    Details   chlorhexidine (PERIDEX) 0.12 % solution       lisinopril (PRINIVIL,ZESTRIL) 5 MG tablet Take 5 mg by mouth once daily.      metformin (GLUCOPHAGE) 500 MG tablet Take 500 mg by  mouth daily with breakfast.      montelukast (SINGULAIR) 10 mg tablet       simvastatin (ZOCOR) 40 MG tablet Take 40 mg by mouth every evening.      famotidine (PEPCID) 20 MG tablet Take 1 tablet (20 mg total) by mouth 2 (two) times daily.  Qty: 56 tablet, Refills: 0             Regular diet, no heavy lifting , keep dressing dry rtc office one week.   Discharge Procedure Orders (must include Diet, Follow-up, Activity)   Diet general     Keep surgical extremity elevated     Lifting restrictions     Call MD for:  temperature >100.4     Call MD for:  persistent nausea and vomiting     Call MD for:  severe uncontrolled pain     Call MD for:  redness, tenderness, or signs of infection (pain, swelling, redness, odor or green/yellow discharge around incision site)     Leave dressing on - Keep it clean, dry, and intact until clinic visit        Discharge Date: 7/29/2019

## 2019-07-26 NOTE — TRANSFER OF CARE
"Anesthesia Transfer of Care Note    Patient: Heri Medina Jr.    Procedure(s) Performed: Procedure(s) (LRB):  CHOLECYSTECTOMY, LAPAROSCOPIC (N/A)    Patient location: PACU    Anesthesia Type: general    Transport from OR: Transported from OR on 6-10 L/min O2 by face mask with adequate spontaneous ventilation    Post pain: adequate analgesia    Post assessment: no apparent anesthetic complications and tolerated procedure well    Post vital signs: stable    Level of consciousness: awake, alert and oriented    Nausea/Vomiting: no nausea/vomiting    Complications: none    Transfer of care protocol was followed      Last vitals:   Visit Vitals  /74   Pulse 60   Temp 36.7 °C (98.1 °F) (Skin)   Resp 18   Ht 5' 10" (1.778 m)   Wt 94.3 kg (208 lb)   SpO2 96%   BMI 29.84 kg/m²     "

## 2019-07-26 NOTE — H&P
History & Physical    SUBJECTIVE:     History of Present Illness:  Patient is a 63 y.o. male presents with      No chief complaint on file.      Review of patient's allergies indicates:  No Known Allergies    Current Facility-Administered Medications   Medication Dose Route Frequency Provider Last Rate Last Dose    0.9%  NaCl infusion   Intravenous Continuous Francesca Zhong MD        cefazolin (ANCEF) 2 gram in dextrose 5% 50 mL IVPB (premix)  2 g Intravenous On Call Procedure Francesca Zhong MD        lactated ringers infusion   Intravenous Continuous Sonam Fisher NP        lidocaine (PF) 10 mg/ml (1%) injection 10 mg  1 mL Intradermal Once Sonam Fisher NP           Past Medical History:   Diagnosis Date    Diabetes mellitus     Pre diabetic    Hyperlipidemia     Hypertension     Hypertension associated with diabetes 2017     Past Surgical History:   Procedure Laterality Date    APPENDECTOMY  1984    ARTHROPLASTY-HIP (ANTERIOR APPROACH) Right 2017    Performed by Paulie Jules MD at [facility] OR    [facility]      scrotum     No family history on file.  Social History     Tobacco Use    Smoking status: Former Smoker     Types: Cigars     Last attempt to quit: 2005     Years since quittin.5    Smokeless tobacco: Never Used   Substance Use Topics    Alcohol use: Not Currently     Alcohol/week: 0.0 oz    Drug use: No        Review of Systems:    Review of Systems   Constitutional: Negative.    HENT: Negative.    Eyes: Negative.    Respiratory: Negative.    Cardiovascular: Negative.    Gastrointestinal: Positive for abdominal distention, abdominal pain and vomiting.   Genitourinary: Negative.    Musculoskeletal: Negative.    Neurological: Negative.    Psychiatric/Behavioral: Negative.        OBJECTIVE:     Vital Signs (Most Recent)              Physical Exam:    Physical Exam   Constitutional: He is oriented to person, place, and time. He appears well-developed and  well-nourished.   HENT:   Head: Normocephalic.   Eyes: Pupils are equal, round, and reactive to light. Conjunctivae and EOM are normal.   Neck: Normal range of motion. Neck supple.   Cardiovascular: Normal rate, regular rhythm, normal heart sounds and intact distal pulses.   Pulmonary/Chest: Effort normal and breath sounds normal.   Abdominal: Soft. Bowel sounds are normal.   Musculoskeletal: Normal range of motion.   Neurological: He is alert and oriented to person, place, and time. He has normal reflexes.   Skin: Skin is warm and dry.       Laboratory      Diagnostic Results:      ASSESSMENT/PLAN:     Dm   htn  Cholecystitis and cholelithiasiis    PLAN:Plan   Lap ez today.

## 2019-08-05 PROBLEM — Z90.49 S/P LAPAROSCOPIC CHOLECYSTECTOMY: Status: ACTIVE | Noted: 2019-08-05

## 2019-08-12 ENCOUNTER — HOSPITAL ENCOUNTER (OUTPATIENT)
Dept: RADIOLOGY | Facility: HOSPITAL | Age: 64
Discharge: HOME | End: 2019-08-12
Attending: INTERNAL MEDICINE
Payer: MEDICAID

## 2019-08-12 DIAGNOSIS — M54.50 LOWER BACK PAIN: Primary | ICD-10-CM

## 2019-08-12 DIAGNOSIS — M25.559 HIP PAIN: Primary | ICD-10-CM

## 2019-08-12 DIAGNOSIS — M54.50 LOWER BACK PAIN: ICD-10-CM

## 2019-08-12 DIAGNOSIS — M25.559 HIP PAIN: ICD-10-CM

## 2019-08-12 PROCEDURE — 72100 XR LUMBAR SPINE AP AND LAT WITH FLEX/EXT: ICD-10-PCS | Mod: 26,,, | Performed by: RADIOLOGY

## 2019-08-12 PROCEDURE — 73502 X-RAY EXAM HIP UNI 2-3 VIEWS: CPT | Mod: TC,FY,RT

## 2019-08-12 PROCEDURE — 73502 X-RAY EXAM HIP UNI 2-3 VIEWS: CPT | Mod: 26,RT,, | Performed by: RADIOLOGY

## 2019-08-12 PROCEDURE — 73502 XR HIP 2 VIEW RIGHT: ICD-10-PCS | Mod: 26,RT,, | Performed by: RADIOLOGY

## 2019-08-12 PROCEDURE — 72120 X-RAY BEND ONLY L-S SPINE: CPT | Mod: 26,,, | Performed by: RADIOLOGY

## 2019-08-12 PROCEDURE — 72120 X-RAY BEND ONLY L-S SPINE: CPT | Mod: TC,FY

## 2019-08-12 PROCEDURE — 72120 XR LUMBAR SPINE AP AND LAT WITH FLEX/EXT: ICD-10-PCS | Mod: 26,,, | Performed by: RADIOLOGY

## 2019-08-12 PROCEDURE — 72100 X-RAY EXAM L-S SPINE 2/3 VWS: CPT | Mod: 26,,, | Performed by: RADIOLOGY

## 2019-12-05 PROBLEM — R19.7 POSTCHOLECYSTECTOMY DIARRHEA: Status: ACTIVE | Noted: 2019-12-05

## 2019-12-05 PROBLEM — K91.89 POSTCHOLECYSTECTOMY DIARRHEA: Status: ACTIVE | Noted: 2019-12-05

## 2020-08-05 ENCOUNTER — TELEPHONE (OUTPATIENT)
Dept: GASTROENTEROLOGY | Facility: CLINIC | Age: 65
End: 2020-08-05

## 2020-08-05 DIAGNOSIS — Z01.818 PREOP EXAMINATION: Primary | ICD-10-CM

## 2020-08-05 DIAGNOSIS — Z86.010 PERSONAL HISTORY OF COLONIC POLYPS: ICD-10-CM

## 2020-08-05 RX ORDER — POLYETHYLENE GLYCOL 3350, SODIUM SULFATE ANHYDROUS, SODIUM BICARBONATE, SODIUM CHLORIDE, POTASSIUM CHLORIDE 236; 22.74; 6.74; 5.86; 2.97 G/4L; G/4L; G/4L; G/4L; G/4L
4 POWDER, FOR SOLUTION ORAL ONCE
Qty: 1 BOTTLE | Refills: 0 | Status: SHIPPED | OUTPATIENT
Start: 2020-08-05 | End: 2020-08-05

## 2020-08-05 NOTE — TELEPHONE ENCOUNTER
Referring Physician: Dr. Mattson                             Date: 8/5/20    Reason for Referral: Personal       Family History of:   Colon polyp: No  Relationship/Age of Onset:       Colon cancer: No  Relationship/Age of Onset:       Patient with:   Hemoccults Done:       Iron deficient:   No      On Blood Thinner: No      Valvular heart disease/valve replacement: No      Anemia Present: No      On NSAID: No      Lung disease: No      Kidney disease: No      Hx of polyps: Yes      Hx of colon cancer: No      Previous colon evalations: Yes  When: 5 years ago  Where: Dr. Slaughter  Pertinent symptoms:           Review of patient's allergies indicates: NKDA        Patient was scheduled for colonoscopy on  9/4/20      with Dr. Funk at [facility].       instructions were reviewed with patient.          Prep sent to [facility] in [place]      GOLYTELY/ COLYTE/ NULYTELY Instructions    You are scheduled for a colonoscopy with Dr. Funk on 9/4/20 at [facility].  To ensure that your test is accurate and complete, you MUST follow these instructions listed below.  If you have any questions, please call our office at [phone].  Plan on being at the hospital for your procedure for 3-4 hours.    1.  Follow a CLEAR LIQUID DIET for the entire day before your scheduled colonoscopy.  This means no solid food the entire day starting when you wake.  You may have as much of the clear liquids as you want throughout the day.   CLEAR LIQUID DIET:   - Avoid Red, Orange, Purple, and/or Blue food coloring   - NO DAIRY   - You can have:  Coffee with sugar (no creamer), tea, water, soda, apple or white grape juice, chicken or beef broth/bouillon (no meat, noodles, or veggies), green/yellow popsicles, green/yellow Jell-O, lemonade.    2.  MIX GOLYTELY/COLYTE/NULYTELY (all names for same product) WITH ONE (1) GALLON OF WATER.  YOU MAY ADD A FLAVOR PACKET OR YELLOW/GREEN POWDER DRINK MIX TO THIS.  PUT IN  REFRIGERATOR.  This is easier to drink if this solution is cold, so you can mix the solution one day ahead of time and place in the refrigerator prior to drinking.  You have to drink the solution within 24 hours of mixing it.  Do NOT put this solution over ice.  It IS ok to drink with a straw.    3. AT 5 PM THE DAY BEFORE YOUR COLONOSCOPY, DRINK ONE (1) 8 OUNCE GLASS OF MIXTURE EVERY 10 MINUTES UNTIL HALF OF THE GALLON IS CONSUMED.  Keep this mixture cold and in refrigerator as much as you can while drinking it.  Place the remaining half of mixture in the refrigerator when you finish the first half.    4.  The endoscopy department will call you 1 day before your colonoscopy to tell you the exact time to arrive, AND to tell you the exact time to drink the 2nd portion of your prep (which will be FIVE HOURS BEFORE YOUR ARRIVAL TIME).  At this time given to you, DRINK ONE (1) 8 OUNCE GLASS OF MIXTURE EVERY 10 MINUTES UNTIL THE OTHER HALF IS CONSUMED. Keep the mixture cold while you are drinking it. Once this is complete, you may not have ANYTHING else by mouth!      5.  It is ok to take MOST of your REGULAR MEDICATIONS  in the morning of your test with a SIP of water.  THE ONLY MEDS YOU NEED TO HOLD ARE YOUR DIABETES MEDICATIONS,  SOME BLOOD PRESSURE MEDS, AND BLOOD THINNERS IF OK'D BY YOUR DOCTOR.  Do NOT have anything else to eat or drink the morning of your colonoscopy.  It is ok to brush your teeth.    6.  If you are on blood thinners THAT YOU HAVE BEEN INSTRUCTED TO HOLD BY YOUR DOCTOR FOR THIS PROCEDURE, then do NOT take this the morning of your colonoscopy.  Do NOT stop these medications on your own, they must be approved to be held by your doctor.  Your colonoscopy can NOT be done if you are on these medications.  Examples of blood thinners include: Coumadin, Aggrenox, Plavix, Pradaxa, Reapro, Pletal, Xarelto, Ticagrelor, Brilinta, Eliquis, and high dose aspirin (325 mg).  You do not have to stop baby aspirin 81  mg.    7.  IF YOU ARE DIABETIC:  NO INSULIN OR ORAL MEDICATIONS THE MORNING OF THE COLONOSCOPY.  TAKE ONLY HALF THE DOSE OF YOUR INSULIN THE DAY BEFORE THE COLONOSCOPY.  DO NOT TAKE ANY ORAL DIABETIC MEDICATIONS THE DAY BEFORE THE COLONOSCOPY.  IF YOU ARE AN INSULIN DEPENDENT DIABETIC WITH UNSTABLE BLOOD SUGARS, NOTIFY YOUR PRIMARY CARE PHYSICIAN FOR INSTRUCTIONS.    8.  Please DO use your inhalers the morning of your procedure.

## 2020-08-05 NOTE — TELEPHONE ENCOUNTER
----- Message from Jose J Rutledge sent at 8/5/2020  1:21 PM CDT -----  Contact: [phone] / self  Patient would like a call back from your office states a referral was sent to your office but has yet to receive a call back. Please Advise.

## 2020-09-01 ENCOUNTER — LAB VISIT (OUTPATIENT)
Dept: FAMILY MEDICINE | Facility: CLINIC | Age: 65
End: 2020-09-01
Payer: MEDICAID

## 2020-09-01 DIAGNOSIS — Z01.818 PREOP EXAMINATION: ICD-10-CM

## 2020-09-01 PROCEDURE — U0003 INFECTIOUS AGENT DETECTION BY NUCLEIC ACID (DNA OR RNA); SEVERE ACUTE RESPIRATORY SYNDROME CORONAVIRUS 2 (SARS-COV-2) (CORONAVIRUS DISEASE [COVID-19]), AMPLIFIED PROBE TECHNIQUE, MAKING USE OF HIGH THROUGHPUT TECHNOLOGIES AS DESCRIBED BY CMS-2020-01-R: HCPCS

## 2020-09-02 LAB — SARS-COV-2 RNA RESP QL NAA+PROBE: NOT DETECTED

## 2020-09-04 PROBLEM — Z86.0100 PERSONAL HISTORY OF COLONIC POLYPS: Status: ACTIVE | Noted: 2020-09-04

## 2020-09-04 PROBLEM — Z86.010 PERSONAL HISTORY OF COLONIC POLYPS: Status: ACTIVE | Noted: 2020-09-04

## 2020-12-01 ENCOUNTER — IMMUNIZATION (OUTPATIENT)
Dept: PHARMACY | Facility: CLINIC | Age: 65
End: 2020-12-01
Payer: MEDICAID

## 2021-02-25 ENCOUNTER — NURSE TRIAGE (OUTPATIENT)
Dept: ADMINISTRATIVE | Facility: CLINIC | Age: 66
End: 2021-02-25

## 2021-02-26 ENCOUNTER — IMMUNIZATION (OUTPATIENT)
Dept: FAMILY MEDICINE | Facility: CLINIC | Age: 66
End: 2021-02-26
Payer: MEDICAID

## 2021-02-26 DIAGNOSIS — Z23 NEED FOR VACCINATION: Primary | ICD-10-CM

## 2021-02-26 PROCEDURE — 91300 COVID-19, MRNA, LNP-S, PF, 30 MCG/0.3 ML DOSE VACCINE: CPT | Mod: PBBFAC | Performed by: FAMILY MEDICINE

## 2021-03-19 ENCOUNTER — IMMUNIZATION (OUTPATIENT)
Dept: FAMILY MEDICINE | Facility: CLINIC | Age: 66
End: 2021-03-19
Payer: MEDICAID

## 2021-03-19 DIAGNOSIS — Z23 NEED FOR VACCINATION: Primary | ICD-10-CM

## 2021-03-19 PROCEDURE — 0002A COVID-19, MRNA, LNP-S, PF, 30 MCG/0.3 ML DOSE VACCINE: CPT | Mod: PBBFAC | Performed by: FAMILY MEDICINE

## 2021-03-19 PROCEDURE — 91300 COVID-19, MRNA, LNP-S, PF, 30 MCG/0.3 ML DOSE VACCINE: CPT | Mod: PBBFAC | Performed by: FAMILY MEDICINE

## 2021-07-01 ENCOUNTER — OFFICE VISIT (OUTPATIENT)
Dept: GASTROENTEROLOGY | Facility: CLINIC | Age: 66
End: 2021-07-01
Payer: MEDICAID

## 2021-07-01 VITALS
BODY MASS INDEX: 28.82 KG/M2 | RESPIRATION RATE: 18 BRPM | HEIGHT: 70 IN | OXYGEN SATURATION: 98 % | SYSTOLIC BLOOD PRESSURE: 120 MMHG | DIASTOLIC BLOOD PRESSURE: 60 MMHG | WEIGHT: 201.31 LBS | HEART RATE: 73 BPM

## 2021-07-01 DIAGNOSIS — K21.9 GASTROESOPHAGEAL REFLUX DISEASE, UNSPECIFIED WHETHER ESOPHAGITIS PRESENT: Primary | ICD-10-CM

## 2021-07-01 PROBLEM — K80.20 CALCULUS OF GALLBLADDER WITHOUT CHOLECYSTITIS WITHOUT OBSTRUCTION: Status: RESOLVED | Noted: 2018-12-03 | Resolved: 2021-07-01

## 2021-07-01 PROBLEM — K27.9 PEPTIC ULCER: Status: RESOLVED | Noted: 2018-12-03 | Resolved: 2021-07-01

## 2021-07-01 PROBLEM — K91.89 POSTCHOLECYSTECTOMY DIARRHEA: Status: RESOLVED | Noted: 2019-12-05 | Resolved: 2021-07-01

## 2021-07-01 PROBLEM — R19.7 POSTCHOLECYSTECTOMY DIARRHEA: Status: RESOLVED | Noted: 2019-12-05 | Resolved: 2021-07-01

## 2021-07-01 PROCEDURE — 99999 PR PBB SHADOW E&M-EST. PATIENT-LVL IV: ICD-10-PCS | Mod: PBBFAC,,, | Performed by: NURSE PRACTITIONER

## 2021-07-01 PROCEDURE — 99203 PR OFFICE/OUTPT VISIT, NEW, LEVL III, 30-44 MIN: ICD-10-PCS | Mod: S$PBB,,, | Performed by: NURSE PRACTITIONER

## 2021-07-01 PROCEDURE — 99999 PR PBB SHADOW E&M-EST. PATIENT-LVL IV: CPT | Mod: PBBFAC,,, | Performed by: NURSE PRACTITIONER

## 2021-07-01 PROCEDURE — 99203 OFFICE O/P NEW LOW 30 MIN: CPT | Mod: S$PBB,,, | Performed by: NURSE PRACTITIONER

## 2021-07-01 PROCEDURE — 99214 OFFICE O/P EST MOD 30 MIN: CPT | Mod: PBBFAC,PO | Performed by: NURSE PRACTITIONER

## 2021-07-01 RX ORDER — PANTOPRAZOLE SODIUM 40 MG/1
40 TABLET, DELAYED RELEASE ORAL DAILY
Qty: 30 TABLET | Refills: 2 | Status: SHIPPED | OUTPATIENT
Start: 2021-07-01 | End: 2022-07-01

## 2021-07-12 PROBLEM — K21.9 GERD (GASTROESOPHAGEAL REFLUX DISEASE): Status: ACTIVE | Noted: 2021-07-12

## 2021-07-21 ENCOUNTER — TELEPHONE (OUTPATIENT)
Dept: GASTROENTEROLOGY | Facility: CLINIC | Age: 66
End: 2021-07-21

## 2021-07-28 ENCOUNTER — HOSPITAL ENCOUNTER (OUTPATIENT)
Dept: RADIOLOGY | Facility: HOSPITAL | Age: 66
Discharge: HOME | End: 2021-07-28
Attending: INTERNAL MEDICINE
Payer: MEDICAID

## 2021-07-28 DIAGNOSIS — R09.89 BRUIT: ICD-10-CM

## 2021-07-28 PROCEDURE — 93880 EXTRACRANIAL BILAT STUDY: CPT | Mod: 26,,, | Performed by: INTERNAL MEDICINE

## 2021-07-28 PROCEDURE — 93880 EXTRACRANIAL BILAT STUDY: CPT | Mod: TC

## 2021-07-28 PROCEDURE — 93880 US CAROTID BILATERAL: ICD-10-PCS | Mod: 26,,, | Performed by: INTERNAL MEDICINE

## 2021-10-01 ENCOUNTER — IMMUNIZATION (OUTPATIENT)
Dept: PRIMARY CARE CLINIC | Facility: CLINIC | Age: 66
End: 2021-10-01
Payer: MEDICAID

## 2021-10-01 DIAGNOSIS — Z23 NEED FOR VACCINATION: Primary | ICD-10-CM

## 2021-10-01 PROCEDURE — 91300 COVID-19, MRNA, LNP-S, PF, 30 MCG/0.3 ML DOSE VACCINE: CPT | Mod: PBBFAC | Performed by: INTERNAL MEDICINE

## 2021-10-01 PROCEDURE — 0003A COVID-19, MRNA, LNP-S, PF, 30 MCG/0.3 ML DOSE VACCINE: CPT | Mod: CV19,PBBFAC | Performed by: INTERNAL MEDICINE

## 2021-11-02 ENCOUNTER — IMMUNIZATION (OUTPATIENT)
Dept: PHARMACY | Facility: CLINIC | Age: 66
End: 2021-11-02
Payer: MEDICAID

## 2022-02-28 ENCOUNTER — LAB VISIT (OUTPATIENT)
Dept: LAB | Facility: HOSPITAL | Age: 67
End: 2022-02-28
Attending: INTERNAL MEDICINE
Payer: MEDICAID

## 2022-02-28 DIAGNOSIS — E11.9 DIABETES MELLITUS WITHOUT COMPLICATION: ICD-10-CM

## 2022-02-28 DIAGNOSIS — E78.2 MIXED HYPERLIPIDEMIA: Primary | ICD-10-CM

## 2022-02-28 LAB
ALBUMIN SERPL BCP-MCNC: 4.3 G/DL (ref 3.5–5.2)
ALP SERPL-CCNC: 77 U/L (ref 55–135)
ALT SERPL W/O P-5'-P-CCNC: 34 U/L (ref 10–44)
AST SERPL-CCNC: 25 U/L (ref 10–40)
BILIRUB DIRECT SERPL-MCNC: 0.2 MG/DL (ref 0.1–0.3)
BILIRUB SERPL-MCNC: 0.6 MG/DL (ref 0.1–1)
CHOLEST SERPL-MCNC: 183 MG/DL (ref 120–199)
CHOLEST/HDLC SERPL: 5.1 {RATIO} (ref 2–5)
ESTIMATED AVG GLUCOSE: 126 MG/DL (ref 68–131)
HBA1C MFR BLD: 6 % (ref 4–5.6)
HDLC SERPL-MCNC: 36 MG/DL (ref 40–75)
HDLC SERPL: 19.7 % (ref 20–50)
LDLC SERPL CALC-MCNC: 115.4 MG/DL (ref 63–159)
NONHDLC SERPL-MCNC: 147 MG/DL
PROT SERPL-MCNC: 8 G/DL (ref 6–8.4)
TRIGL SERPL-MCNC: 158 MG/DL (ref 30–150)
TSH SERPL DL<=0.005 MIU/L-ACNC: 1.42 UIU/ML (ref 0.4–4)

## 2022-02-28 PROCEDURE — 84443 ASSAY THYROID STIM HORMONE: CPT | Performed by: INTERNAL MEDICINE

## 2022-02-28 PROCEDURE — 80061 LIPID PANEL: CPT | Performed by: INTERNAL MEDICINE

## 2022-02-28 PROCEDURE — 36415 COLL VENOUS BLD VENIPUNCTURE: CPT | Performed by: INTERNAL MEDICINE

## 2022-02-28 PROCEDURE — 80076 HEPATIC FUNCTION PANEL: CPT | Performed by: INTERNAL MEDICINE

## 2022-02-28 PROCEDURE — 83036 HEMOGLOBIN GLYCOSYLATED A1C: CPT | Performed by: INTERNAL MEDICINE

## 2023-02-28 ENCOUNTER — LAB VISIT (OUTPATIENT)
Dept: LAB | Facility: HOSPITAL | Age: 68
End: 2023-02-28
Attending: INTERNAL MEDICINE
Payer: MEDICARE

## 2023-02-28 DIAGNOSIS — E11.9 DIABETES MELLITUS WITHOUT COMPLICATION: ICD-10-CM

## 2023-02-28 DIAGNOSIS — E78.2 MIXED HYPERLIPIDEMIA: Primary | ICD-10-CM

## 2023-02-28 LAB
ALBUMIN SERPL BCP-MCNC: 4.5 G/DL (ref 3.5–5.2)
ALP SERPL-CCNC: 92 U/L (ref 55–135)
ALT SERPL W/O P-5'-P-CCNC: 36 U/L (ref 10–44)
AST SERPL-CCNC: 31 U/L (ref 10–40)
BILIRUB DIRECT SERPL-MCNC: 0.2 MG/DL (ref 0.1–0.3)
BILIRUB SERPL-MCNC: 0.6 MG/DL (ref 0.1–1)
CHOLEST SERPL-MCNC: 167 MG/DL (ref 120–199)
CHOLEST/HDLC SERPL: 4.4 {RATIO} (ref 2–5)
ESTIMATED AVG GLUCOSE: 128 MG/DL (ref 68–131)
HBA1C MFR BLD: 6.1 % (ref 4–5.6)
HDLC SERPL-MCNC: 38 MG/DL (ref 40–75)
HDLC SERPL: 22.8 % (ref 20–50)
LDLC SERPL CALC-MCNC: 106.2 MG/DL (ref 63–159)
NONHDLC SERPL-MCNC: 129 MG/DL
PROT SERPL-MCNC: 8.4 G/DL (ref 6–8.4)
TRIGL SERPL-MCNC: 114 MG/DL (ref 30–150)
TSH SERPL DL<=0.005 MIU/L-ACNC: 1.26 UIU/ML (ref 0.4–4)

## 2023-02-28 PROCEDURE — 36415 COLL VENOUS BLD VENIPUNCTURE: CPT | Performed by: INTERNAL MEDICINE

## 2023-02-28 PROCEDURE — 84443 ASSAY THYROID STIM HORMONE: CPT | Performed by: INTERNAL MEDICINE

## 2023-02-28 PROCEDURE — 83036 HEMOGLOBIN GLYCOSYLATED A1C: CPT | Performed by: INTERNAL MEDICINE

## 2023-02-28 PROCEDURE — 80061 LIPID PANEL: CPT | Performed by: INTERNAL MEDICINE

## 2023-02-28 PROCEDURE — 80076 HEPATIC FUNCTION PANEL: CPT | Performed by: INTERNAL MEDICINE

## 2023-06-27 ENCOUNTER — LAB VISIT (OUTPATIENT)
Dept: LAB | Facility: HOSPITAL | Age: 68
End: 2023-06-27
Attending: INTERNAL MEDICINE
Payer: MEDICARE

## 2023-06-27 DIAGNOSIS — N40.0 BENIGN ENLARGEMENT OF PROSTATE: ICD-10-CM

## 2023-06-27 DIAGNOSIS — D64.9 ANEMIA, UNSPECIFIED: ICD-10-CM

## 2023-06-27 DIAGNOSIS — E11.9 DIABETES MELLITUS WITHOUT COMPLICATION: ICD-10-CM

## 2023-06-27 DIAGNOSIS — E78.2 MIXED HYPERLIPIDEMIA: Primary | ICD-10-CM

## 2023-06-27 LAB
ALBUMIN SERPL BCP-MCNC: 4.6 G/DL (ref 3.5–5.2)
ALBUMIN/CREAT UR: 21 UG/MG (ref 0–30)
ALP SERPL-CCNC: 86 U/L (ref 55–135)
ALT SERPL W/O P-5'-P-CCNC: 30 U/L (ref 10–44)
ANION GAP SERPL CALC-SCNC: 11 MMOL/L (ref 8–16)
AST SERPL-CCNC: 25 U/L (ref 10–40)
BASOPHILS # BLD AUTO: 0.06 K/UL (ref 0–0.2)
BASOPHILS NFR BLD: 1 % (ref 0–1.9)
BILIRUB SERPL-MCNC: 0.6 MG/DL (ref 0.1–1)
BUN SERPL-MCNC: 13 MG/DL (ref 8–23)
CALCIUM SERPL-MCNC: 10.3 MG/DL (ref 8.7–10.5)
CHLORIDE SERPL-SCNC: 102 MMOL/L (ref 95–110)
CO2 SERPL-SCNC: 25 MMOL/L (ref 23–29)
CREAT SERPL-MCNC: 0.8 MG/DL (ref 0.5–1.4)
CREAT UR-MCNC: 405 MG/DL (ref 23–375)
DIFFERENTIAL METHOD: ABNORMAL
EOSINOPHIL # BLD AUTO: 0.1 K/UL (ref 0–0.5)
EOSINOPHIL NFR BLD: 1.2 % (ref 0–8)
ERYTHROCYTE [DISTWIDTH] IN BLOOD BY AUTOMATED COUNT: 14.1 % (ref 11.5–14.5)
EST. GFR  (NO RACE VARIABLE): >60 ML/MIN/1.73 M^2
ESTIMATED AVG GLUCOSE: 114 MG/DL (ref 68–131)
GLUCOSE SERPL-MCNC: 87 MG/DL (ref 70–110)
HBA1C MFR BLD: 5.6 % (ref 4–5.6)
HCT VFR BLD AUTO: 51.2 % (ref 40–54)
HGB BLD-MCNC: 15.9 G/DL (ref 14–18)
IMM GRANULOCYTES # BLD AUTO: 0.01 K/UL (ref 0–0.04)
IMM GRANULOCYTES NFR BLD AUTO: 0.2 % (ref 0–0.5)
LYMPHOCYTES # BLD AUTO: 1.5 K/UL (ref 1–4.8)
LYMPHOCYTES NFR BLD: 25.2 % (ref 18–48)
MCH RBC QN AUTO: 25 PG (ref 27–31)
MCHC RBC AUTO-ENTMCNC: 31.1 G/DL (ref 32–36)
MCV RBC AUTO: 81 FL (ref 82–98)
MICROALBUMIN UR DL<=1MG/L-MCNC: 85 UG/ML
MONOCYTES # BLD AUTO: 0.8 K/UL (ref 0.3–1)
MONOCYTES NFR BLD: 12.5 % (ref 4–15)
NEUTROPHILS # BLD AUTO: 3.6 K/UL (ref 1.8–7.7)
NEUTROPHILS NFR BLD: 59.9 % (ref 38–73)
NRBC BLD-RTO: 0 /100 WBC
PLATELET # BLD AUTO: 288 K/UL (ref 150–450)
PMV BLD AUTO: 10.2 FL (ref 9.2–12.9)
POTASSIUM SERPL-SCNC: 4.4 MMOL/L (ref 3.5–5.1)
PROSTATE SPECIFIC ANTIGEN, TOTAL: 3.8 NG/ML (ref 0–4)
PROT SERPL-MCNC: 8 G/DL (ref 6–8.4)
PSA FREE MFR SERPL: 35 %
PSA FREE SERPL-MCNC: 1.33 NG/ML (ref 0–1.5)
RBC # BLD AUTO: 6.36 M/UL (ref 4.6–6.2)
SODIUM SERPL-SCNC: 138 MMOL/L (ref 136–145)
TSH SERPL DL<=0.005 MIU/L-ACNC: 1.51 UIU/ML (ref 0.4–4)
WBC # BLD AUTO: 6.07 K/UL (ref 3.9–12.7)

## 2023-06-27 PROCEDURE — 84153 ASSAY OF PSA TOTAL: CPT | Performed by: INTERNAL MEDICINE

## 2023-06-27 PROCEDURE — 36415 COLL VENOUS BLD VENIPUNCTURE: CPT | Performed by: INTERNAL MEDICINE

## 2023-06-27 PROCEDURE — 83036 HEMOGLOBIN GLYCOSYLATED A1C: CPT | Performed by: INTERNAL MEDICINE

## 2023-06-27 PROCEDURE — 80053 COMPREHEN METABOLIC PANEL: CPT | Performed by: INTERNAL MEDICINE

## 2023-06-27 PROCEDURE — 84443 ASSAY THYROID STIM HORMONE: CPT | Performed by: INTERNAL MEDICINE

## 2023-06-27 PROCEDURE — 85025 COMPLETE CBC W/AUTO DIFF WBC: CPT | Performed by: INTERNAL MEDICINE

## 2023-06-27 PROCEDURE — 82570 ASSAY OF URINE CREATININE: CPT | Performed by: INTERNAL MEDICINE

## 2023-07-03 ENCOUNTER — OFFICE VISIT (OUTPATIENT)
Dept: URGENT CARE | Facility: CLINIC | Age: 68
End: 2023-07-03
Payer: MEDICARE

## 2023-07-03 VITALS
RESPIRATION RATE: 16 BRPM | WEIGHT: 201 LBS | HEIGHT: 71 IN | HEART RATE: 65 BPM | DIASTOLIC BLOOD PRESSURE: 83 MMHG | BODY MASS INDEX: 28.14 KG/M2 | TEMPERATURE: 98 F | OXYGEN SATURATION: 96 % | SYSTOLIC BLOOD PRESSURE: 130 MMHG

## 2023-07-03 DIAGNOSIS — B34.9 VIRAL SYNDROME: Primary | ICD-10-CM

## 2023-07-03 LAB
CTP QC/QA: YES
SARS-COV-2 AG RESP QL IA.RAPID: NEGATIVE

## 2023-07-03 PROCEDURE — 99203 OFFICE O/P NEW LOW 30 MIN: CPT | Mod: S$GLB,,, | Performed by: FAMILY MEDICINE

## 2023-07-03 PROCEDURE — 87811 SARS-COV-2 COVID19 W/OPTIC: CPT | Mod: QW,S$GLB,, | Performed by: FAMILY MEDICINE

## 2023-07-03 PROCEDURE — 99203 PR OFFICE/OUTPT VISIT, NEW, LEVL III, 30-44 MIN: ICD-10-PCS | Mod: S$GLB,,, | Performed by: FAMILY MEDICINE

## 2023-07-03 PROCEDURE — 87811 SARS CORONAVIRUS 2 ANTIGEN POCT, MANUAL READ: ICD-10-PCS | Mod: QW,S$GLB,, | Performed by: FAMILY MEDICINE

## 2023-07-03 RX ORDER — FLUTICASONE PROPIONATE 50 MCG
1 SPRAY, SUSPENSION (ML) NASAL DAILY
Qty: 9.9 ML | Refills: 0 | Status: SHIPPED | OUTPATIENT
Start: 2023-07-03

## 2023-07-03 RX ORDER — LEVOCETIRIZINE DIHYDROCHLORIDE 5 MG/1
5 TABLET, FILM COATED ORAL NIGHTLY
Qty: 30 TABLET | Refills: 11 | Status: SHIPPED | OUTPATIENT
Start: 2023-07-03 | End: 2024-07-02

## 2023-07-03 RX ORDER — BENZONATATE 200 MG/1
200 CAPSULE ORAL 3 TIMES DAILY PRN
Qty: 21 CAPSULE | Refills: 0 | Status: SHIPPED | OUTPATIENT
Start: 2023-07-03 | End: 2023-07-10

## 2023-07-03 RX ORDER — ATORVASTATIN CALCIUM 40 MG/1
TABLET, FILM COATED ORAL
COMMUNITY
Start: 2023-05-02

## 2023-07-03 NOTE — PROGRESS NOTES
"Subjective:      Patient ID: Heri Medina Jr. is a 67 y.o. male.    Vitals:  height is 5' 11" (1.803 m) and weight is 91.2 kg (201 lb). His oral temperature is 97.8 °F (36.6 °C). His blood pressure is 130/83 and his pulse is 65. His respiration is 16 and oxygen saturation is 96%.     Chief Complaint: Cough    67 year old male patient presenting with cough x4-5 days and sore throat  x3-4 days.    Cough  This is a new problem. The current episode started in the past 7 days (5 days ago). The problem has been gradually worsening. The problem occurs every few minutes. The cough is Non-productive. Associated symptoms include postnasal drip and a sore throat. Pertinent negatives include no ear pain, fever, headaches or nasal congestion. Nothing aggravates the symptoms. Treatments tried: (old antibiotic), claritin D, sudafed. The treatment provided no relief. There is no history of asthma, bronchitis or pneumonia.     Constitution: Negative for fever.   HENT:  Positive for postnasal drip and sore throat. Negative for ear pain.    Respiratory:  Positive for cough.    Neurological:  Negative for headaches.    Objective:     Physical Exam   Constitutional: He is oriented to person, place, and time.   HENT:   Head: Normocephalic.   Ears:   Right Ear: External ear normal.   Left Ear: External ear normal.   Nose: Nose normal.   Mouth/Throat: Mucous membranes are moist.   Eyes: Conjunctivae are normal.   Cardiovascular: Normal rate.   Pulmonary/Chest: Effort normal.   Musculoskeletal: Normal range of motion.         General: Normal range of motion.   Neurological: He is alert and oriented to person, place, and time.   Skin: Skin is dry.   Psychiatric: His behavior is normal.   .this  Assessment:     1. Viral syndrome      Results for orders placed or performed in visit on 07/03/23   SARS Coronavirus 2 Antigen, POCT Manual Read   Result Value Ref Range    SARS Coronavirus 2 Antigen Negative Negative     Acceptable " Yes        Plan:       Viral syndrome  -     SARS Coronavirus 2 Antigen, POCT Manual Read  -     levocetirizine (XYZAL) 5 MG tablet; Take 1 tablet (5 mg total) by mouth every evening.  Dispense: 30 tablet; Refill: 11  -     fluticasone propionate (FLONASE) 50 mcg/actuation nasal spray; 1 spray (50 mcg total) by Each Nostril route once daily.  Dispense: 9.9 mL; Refill: 0  -     benzonatate (TESSALON) 200 MG capsule; Take 1 capsule (200 mg total) by mouth 3 (three) times daily as needed for Cough.  Dispense: 21 capsule; Refill: 0        Supportive care  RTC PRN

## 2024-02-27 ENCOUNTER — LAB VISIT (OUTPATIENT)
Dept: LAB | Facility: HOSPITAL | Age: 69
End: 2024-02-27
Payer: MEDICARE

## 2024-02-27 DIAGNOSIS — E78.2 MIXED HYPERLIPIDEMIA: Primary | ICD-10-CM

## 2024-02-27 DIAGNOSIS — E11.9 DIABETES MELLITUS WITHOUT COMPLICATION: ICD-10-CM

## 2024-02-27 LAB
ALBUMIN SERPL BCP-MCNC: 4 G/DL (ref 3.5–5.2)
ALP SERPL-CCNC: 73 U/L (ref 55–135)
ALT SERPL W/O P-5'-P-CCNC: 27 U/L (ref 10–44)
ANION GAP SERPL CALC-SCNC: 9 MMOL/L (ref 8–16)
AST SERPL-CCNC: 24 U/L (ref 10–40)
BILIRUB SERPL-MCNC: 0.5 MG/DL (ref 0.1–1)
BUN SERPL-MCNC: 14 MG/DL (ref 8–23)
CALCIUM SERPL-MCNC: 10 MG/DL (ref 8.7–10.5)
CHLORIDE SERPL-SCNC: 103 MMOL/L (ref 95–110)
CHOLEST SERPL-MCNC: 161 MG/DL (ref 120–199)
CHOLEST/HDLC SERPL: 4.5 {RATIO} (ref 2–5)
CO2 SERPL-SCNC: 26 MMOL/L (ref 23–29)
CREAT SERPL-MCNC: 1.2 MG/DL (ref 0.5–1.4)
EST. GFR  (NO RACE VARIABLE): >60 ML/MIN/1.73 M^2
ESTIMATED AVG GLUCOSE: 123 MG/DL (ref 68–131)
GLUCOSE SERPL-MCNC: 109 MG/DL (ref 70–110)
HBA1C MFR BLD: 5.9 % (ref 4–5.6)
HDLC SERPL-MCNC: 36 MG/DL (ref 40–75)
HDLC SERPL: 22.4 % (ref 20–50)
LDLC SERPL CALC-MCNC: 95.4 MG/DL (ref 63–159)
NONHDLC SERPL-MCNC: 125 MG/DL
POTASSIUM SERPL-SCNC: 4.9 MMOL/L (ref 3.5–5.1)
PROT SERPL-MCNC: 7.2 G/DL (ref 6–8.4)
SODIUM SERPL-SCNC: 138 MMOL/L (ref 136–145)
TRIGL SERPL-MCNC: 148 MG/DL (ref 30–150)
TSH SERPL DL<=0.005 MIU/L-ACNC: 1.55 UIU/ML (ref 0.4–4)

## 2024-02-27 PROCEDURE — 80061 LIPID PANEL: CPT | Performed by: INTERNAL MEDICINE

## 2024-02-27 PROCEDURE — 80053 COMPREHEN METABOLIC PANEL: CPT | Performed by: INTERNAL MEDICINE

## 2024-02-27 PROCEDURE — 84443 ASSAY THYROID STIM HORMONE: CPT | Performed by: INTERNAL MEDICINE

## 2024-02-27 PROCEDURE — 36415 COLL VENOUS BLD VENIPUNCTURE: CPT | Performed by: INTERNAL MEDICINE

## 2024-02-27 PROCEDURE — 83036 HEMOGLOBIN GLYCOSYLATED A1C: CPT | Performed by: INTERNAL MEDICINE

## 2024-07-26 ENCOUNTER — ANESTHESIA EVENT (OUTPATIENT)
Dept: SURGERY | Facility: OTHER | Age: 69
End: 2024-07-26
Payer: MEDICARE

## 2024-07-26 RX ORDER — SODIUM CHLORIDE, SODIUM LACTATE, POTASSIUM CHLORIDE, CALCIUM CHLORIDE 600; 310; 30; 20 MG/100ML; MG/100ML; MG/100ML; MG/100ML
INJECTION, SOLUTION INTRAVENOUS CONTINUOUS
Status: CANCELLED | OUTPATIENT
Start: 2024-07-26

## 2024-07-26 RX ORDER — LIDOCAINE HYDROCHLORIDE 10 MG/ML
0.5 INJECTION, SOLUTION EPIDURAL; INFILTRATION; INTRACAUDAL; PERINEURAL ONCE
Status: CANCELLED | OUTPATIENT
Start: 2024-07-26 | End: 2024-07-26

## 2024-07-26 RX ORDER — ACETAMINOPHEN 500 MG
1000 TABLET ORAL
Status: CANCELLED | OUTPATIENT
Start: 2024-07-26 | End: 2024-07-26

## 2024-07-30 ENCOUNTER — HOSPITAL ENCOUNTER (OUTPATIENT)
Dept: PREADMISSION TESTING | Facility: OTHER | Age: 69
Discharge: HOME | End: 2024-07-30
Attending: ORTHOPAEDIC SURGERY
Payer: MEDICARE

## 2024-07-30 VITALS
DIASTOLIC BLOOD PRESSURE: 73 MMHG | WEIGHT: 201 LBS | SYSTOLIC BLOOD PRESSURE: 133 MMHG | HEIGHT: 71 IN | RESPIRATION RATE: 16 BRPM | HEART RATE: 81 BPM | BODY MASS INDEX: 28.14 KG/M2 | TEMPERATURE: 98 F | OXYGEN SATURATION: 96 %

## 2024-07-30 DIAGNOSIS — S83.242A OTHER TEAR OF MEDIAL MENISCUS, CURRENT INJURY, LEFT KNEE, INITIAL ENCOUNTER: Primary | ICD-10-CM

## 2024-07-30 NOTE — ANESTHESIA PREPROCEDURE EVALUATION
07/30/2024  Heri Medina Jr. is a 68 y.o., male.      Pre-op Assessment    I have reviewed the Patient Summary Reports.     I have reviewed the Nursing Notes. I have reviewed the NPO Status.   I have reviewed the Medications.     Review of Systems  Anesthesia Hx:  No problems with previous Anesthesia             Denies Family Hx of Anesthesia complications.    Denies Personal Hx of Anesthesia complications.                    Social:  Non-Smoker       Hematology/Oncology:  Hematology Normal   Oncology Normal                                   EENT/Dental:  EENT/Dental Normal           Cardiovascular:     Hypertension                                        Pulmonary:        Sleep Apnea                Renal/:  Renal/ Normal                 Hepatic/GI:     GERD             Musculoskeletal:  Musculoskeletal Normal                Neurological:  Neurology Normal                                      Endocrine:  Diabetes           Dermatological:  Skin Normal    Psych:  Psychiatric Normal                    Physical Exam  General: Well nourished and Alert    Airway:  Mallampati: II   Mouth Opening: Normal  Tongue: Normal    Dental:  Intact        Anesthesia Plan  Type of Anesthesia, risks & benefits discussed:    Anesthesia Type: Gen ETT  Intra-op Monitoring Plan: Standard ASA Monitors  Post Op Pain Control Plan: multimodal analgesia  Induction:  IV  Airway Plan: Video  Informed Consent: Informed consent signed with the Patient and all parties understand the risks and agree with anesthesia plan.  All questions answered.   ASA Score: 2  Anesthesia Plan Notes: Normal EKG from 7/23 in Care Everywhere    Ready For Surgery From Anesthesia Perspective.     .

## 2024-07-30 NOTE — DISCHARGE INSTRUCTIONS
Information to Prepare you for your Surgery    PRE-ADMIT TESTING   [address] [facility]       Your surgery has been scheduled at [facility]. We are pleased to have the opportunity to serve you. For Further Information please call [phone].    On the day of surgery please report to the Information Desk on the 1st floor.    CONTACT YOUR PHYSICIAN'S OFFICE THE DAY PRIOR TO YOUR SURGERY TO OBTAIN YOUR ARRIVAL TIME.     The evening before surgery do not eat anything after 9 p.m. ( this includes hard candy, chewing gum and mints).  You may only have WATER  from 9 p.m. until you leave your home.   DO NOT DRINK ANY LIQUIDS ON THE WAY TO THE HOSPITAL.        If you are a diabetic-drink only water prior to surgery.    Outpatient Surgery- May allow 2 adults (18 and older)/ Support Persons (1 being the designated ) for all surgical/procedural patients. A breastfeeding mother will be allowed her infant and 2 adult Support Persons. No one under the age of 18 will be allowed in the building.      SPECIAL MEDICATION INSTRUCTIONS: TAKE medications checked off by the Anesthesiologist on your Medication List.    Surgery Patients:  If you take ASPIRIN - Your PHYSICIAN/SURGEON will need to inform you IF/OR when you need to stop taking aspirin prior to your surgery.     Starting the week prior to surgery, do not take any medications containing IBUPROFEN or NSAIDS (Advil, Aleve, BC, Goody's, Ketorolac, Meloxicam, Mobic, Motrin, Naproxen, Toradol, etc).  If you are not sure if you should take a medicine please call your surgeon's office.  You may take Tylenol.    Do Not Wear any make-up (especially eye make-up) to surgery. Please remove any false eyelashes or eyelash extensions. If you arrive the day of surgery with makeup/eyelashes on you will be required to remove prior to surgery. (There is a risk of corneal abrasions if eye makeup/eyelash extensions are not removed)    Leave all valuables  at home.   Do Not wear any jewelry or watches, including any metal in body piercings. Jewelry must be removed prior to coming to the hospital.  There is a possibility that rings that are unable to be removed may be cut off if they are on the surgical extremity.    Please remove all hair extensions, wigs, clips and any other metal accessories/ ornaments from your hair.  These items may pose a flammable/fire risk in Surgery and must be removed.    Do not shave your surgical area at least 5 days prior to your surgery. The surgical prep will be performed at the hospital according to Infection Control regulations.    Contact Lens must be removed before surgery. Either do not wear the contact lens or bring a case and solution for storage.  Please bring a container for eyeglasses or dentures as required.  Bring any paperwork your physician has provided, such as consent forms,  history and physicals, doctor's orders, etc.   Bring comfortable clothes that are loose fitting to wear upon discharge. Take into consideration the type of surgery being performed.  Maintain your diet as advised per your physician the day prior to surgery.    Adequate rest the night before surgery is advised.   Park in the Parking lot behind the hospital or in the [employer] [facility] across the street from the parking lot. Parking is complimentary.  If you will be discharged the same day as your procedure, please arrange for a responsible adult to drive you home or to accompany you if traveling by taxi.   YOU WILL NOT BE PERMITTED TO DRIVE OR TO LEAVE THE HOSPITAL ALONE AFTER SURGERY.   If you are being discharged the same day, it is strongly recommended that you arrange for someone to remain with you for the first 24 hrs following your surgery.    The Surgeon will speak to your family/visitor after your surgery regarding the outcome of your surgery and post op care.  The Surgeon may speak to you after your surgery, but there is a possibility you  may not remember the details.  Please check with your family members regarding the conversation with the Surgeon.    We strongly recommend whoever is bringing you home be present for discharge instructions.  This will ensure a thorough understanding for your post op home care.              Bathing Instructions with Hibiclens  Shower the evening before and morning of your procedure with Chlorhexidine (Hibiclens)    Do not use Chlorhexidine on your face or genitals. Do not get in your eyes.  Wash your face with water and your regular face wash/soap  Use your regular shampoo  Apply Chlorhexidine (Hibiclens) directly on your skin or on a wet washcloth and wash gently. When showering: Move away from the shower stream when applying Chlorhexidine (Hibiclens) to avoid rinsing off too soon.  Rinse thoroughly with warm water  Do not dilute Chlorhexidine (Hibiclens)   Dry off as usual, do not use any deodorant, powder, body lotions, perfume, after shave or cologne.

## 2024-08-02 ENCOUNTER — ANESTHESIA (OUTPATIENT)
Dept: SURGERY | Facility: OTHER | Age: 69
End: 2024-08-02
Payer: MEDICARE

## 2024-08-02 ENCOUNTER — HOSPITAL ENCOUNTER (OUTPATIENT)
Facility: OTHER | Age: 69
Discharge: HOME | End: 2024-08-02
Attending: ORTHOPAEDIC SURGERY | Admitting: ORTHOPAEDIC SURGERY
Payer: MEDICARE

## 2024-08-02 DIAGNOSIS — S83.242A OTHER TEAR OF MEDIAL MENISCUS, CURRENT INJURY, LEFT KNEE, INITIAL ENCOUNTER: ICD-10-CM

## 2024-08-02 LAB
POCT GLUCOSE: 114 MG/DL (ref 70–110)
POCT GLUCOSE: 116 MG/DL (ref 70–110)

## 2024-08-02 PROCEDURE — 25000003 PHARM REV CODE 250: Performed by: NURSE ANESTHETIST, CERTIFIED REGISTERED

## 2024-08-02 PROCEDURE — 63600175 PHARM REV CODE 636 W HCPCS: Performed by: ORTHOPAEDIC SURGERY

## 2024-08-02 PROCEDURE — 25000003 PHARM REV CODE 250: Performed by: ORTHOPAEDIC SURGERY

## 2024-08-02 PROCEDURE — 27201423 OPTIME MED/SURG SUP & DEVICES STERILE SUPPLY: Performed by: ORTHOPAEDIC SURGERY

## 2024-08-02 PROCEDURE — 25000003 PHARM REV CODE 250: Performed by: ANESTHESIOLOGY

## 2024-08-02 PROCEDURE — 63600175 PHARM REV CODE 636 W HCPCS: Performed by: NURSE ANESTHETIST, CERTIFIED REGISTERED

## 2024-08-02 PROCEDURE — 71000033 HC RECOVERY, INTIAL HOUR: Performed by: ORTHOPAEDIC SURGERY

## 2024-08-02 PROCEDURE — 71000016 HC POSTOP RECOV ADDL HR: Performed by: ORTHOPAEDIC SURGERY

## 2024-08-02 PROCEDURE — 63600175 PHARM REV CODE 636 W HCPCS: Performed by: ANESTHESIOLOGY

## 2024-08-02 PROCEDURE — 71000015 HC POSTOP RECOV 1ST HR: Performed by: ORTHOPAEDIC SURGERY

## 2024-08-02 PROCEDURE — 36000710: Performed by: ORTHOPAEDIC SURGERY

## 2024-08-02 PROCEDURE — 36000711: Performed by: ORTHOPAEDIC SURGERY

## 2024-08-02 PROCEDURE — 37000009 HC ANESTHESIA EA ADD 15 MINS: Performed by: ORTHOPAEDIC SURGERY

## 2024-08-02 PROCEDURE — 37000008 HC ANESTHESIA 1ST 15 MINUTES: Performed by: ORTHOPAEDIC SURGERY

## 2024-08-02 RX ORDER — MORPHINE SULFATE 10 MG/ML
INJECTION, SOLUTION INTRAMUSCULAR; INTRAVENOUS
Status: DISCONTINUED | OUTPATIENT
Start: 2024-08-02 | End: 2024-08-02 | Stop reason: HOSPADM

## 2024-08-02 RX ORDER — HYDROMORPHONE HYDROCHLORIDE 2 MG/ML
0.4 INJECTION, SOLUTION INTRAMUSCULAR; INTRAVENOUS; SUBCUTANEOUS EVERY 5 MIN PRN
Status: DISCONTINUED | OUTPATIENT
Start: 2024-08-02 | End: 2024-08-02 | Stop reason: HOSPADM

## 2024-08-02 RX ORDER — OXYCODONE HYDROCHLORIDE 5 MG/1
5 TABLET ORAL
Status: DISCONTINUED | OUTPATIENT
Start: 2024-08-02 | End: 2024-08-02 | Stop reason: HOSPADM

## 2024-08-02 RX ORDER — MUPIROCIN 20 MG/G
OINTMENT TOPICAL 2 TIMES DAILY
Status: DISCONTINUED | OUTPATIENT
Start: 2024-08-02 | End: 2024-08-02 | Stop reason: HOSPADM

## 2024-08-02 RX ORDER — LIDOCAINE HYDROCHLORIDE 10 MG/ML
0.5 INJECTION, SOLUTION EPIDURAL; INFILTRATION; INTRACAUDAL; PERINEURAL ONCE
Status: DISCONTINUED | OUTPATIENT
Start: 2024-08-02 | End: 2024-08-02 | Stop reason: HOSPADM

## 2024-08-02 RX ORDER — LIDOCAINE HYDROCHLORIDE 20 MG/ML
INJECTION INTRAVENOUS
Status: DISCONTINUED | OUTPATIENT
Start: 2024-08-02 | End: 2024-08-02

## 2024-08-02 RX ORDER — DEXAMETHASONE SODIUM PHOSPHATE 4 MG/ML
INJECTION, SOLUTION INTRA-ARTICULAR; INTRALESIONAL; INTRAMUSCULAR; INTRAVENOUS; SOFT TISSUE
Status: DISCONTINUED | OUTPATIENT
Start: 2024-08-02 | End: 2024-08-02

## 2024-08-02 RX ORDER — ONDANSETRON 4 MG/1
8 TABLET, ORALLY DISINTEGRATING ORAL EVERY 8 HOURS PRN
Qty: 20 TABLET | Refills: 0 | Status: SHIPPED | OUTPATIENT
Start: 2024-08-02

## 2024-08-02 RX ORDER — MEPERIDINE HYDROCHLORIDE 25 MG/ML
12.5 INJECTION INTRAMUSCULAR; INTRAVENOUS; SUBCUTANEOUS ONCE AS NEEDED
Status: DISCONTINUED | OUTPATIENT
Start: 2024-08-02 | End: 2024-08-02 | Stop reason: HOSPADM

## 2024-08-02 RX ORDER — PROPOFOL 10 MG/ML
VIAL (ML) INTRAVENOUS
Status: DISCONTINUED | OUTPATIENT
Start: 2024-08-02 | End: 2024-08-02

## 2024-08-02 RX ORDER — FENTANYL CITRATE 50 UG/ML
INJECTION, SOLUTION INTRAMUSCULAR; INTRAVENOUS
Status: DISCONTINUED | OUTPATIENT
Start: 2024-08-02 | End: 2024-08-02

## 2024-08-02 RX ORDER — SODIUM CHLORIDE, SODIUM LACTATE, POTASSIUM CHLORIDE, CALCIUM CHLORIDE 600; 310; 30; 20 MG/100ML; MG/100ML; MG/100ML; MG/100ML
INJECTION, SOLUTION INTRAVENOUS CONTINUOUS
Status: DISCONTINUED | OUTPATIENT
Start: 2024-08-02 | End: 2024-08-02 | Stop reason: HOSPADM

## 2024-08-02 RX ORDER — GLUCAGON 1 MG
1 KIT INJECTION
Status: DISCONTINUED | OUTPATIENT
Start: 2024-08-02 | End: 2024-08-02 | Stop reason: HOSPADM

## 2024-08-02 RX ORDER — ONDANSETRON HYDROCHLORIDE 2 MG/ML
4 INJECTION, SOLUTION INTRAVENOUS DAILY PRN
Status: DISCONTINUED | OUTPATIENT
Start: 2024-08-02 | End: 2024-08-02 | Stop reason: HOSPADM

## 2024-08-02 RX ORDER — SODIUM CHLORIDE 0.9 % (FLUSH) 0.9 %
3 SYRINGE (ML) INJECTION
Status: DISCONTINUED | OUTPATIENT
Start: 2024-08-02 | End: 2024-08-02 | Stop reason: HOSPADM

## 2024-08-02 RX ORDER — ONDANSETRON HYDROCHLORIDE 2 MG/ML
INJECTION, SOLUTION INTRAMUSCULAR; INTRAVENOUS
Status: DISCONTINUED | OUTPATIENT
Start: 2024-08-02 | End: 2024-08-02

## 2024-08-02 RX ORDER — BUPIVACAINE HYDROCHLORIDE 2.5 MG/ML
INJECTION, SOLUTION EPIDURAL; INFILTRATION; INTRACAUDAL
Status: DISCONTINUED | OUTPATIENT
Start: 2024-08-02 | End: 2024-08-02 | Stop reason: HOSPADM

## 2024-08-02 RX ORDER — TRANEXAMIC ACID 100 MG/ML
2000 INJECTION, SOLUTION INTRAVENOUS
Status: DISCONTINUED | OUTPATIENT
Start: 2024-08-02 | End: 2024-08-02 | Stop reason: HOSPADM

## 2024-08-02 RX ORDER — HYDROCODONE BITARTRATE AND ACETAMINOPHEN 5; 325 MG/1; MG/1
1 TABLET ORAL EVERY 6 HOURS PRN
Qty: 30 TABLET | Refills: 0 | Status: SHIPPED | OUTPATIENT
Start: 2024-08-02

## 2024-08-02 RX ORDER — PROCHLORPERAZINE EDISYLATE 5 MG/ML
5 INJECTION INTRAMUSCULAR; INTRAVENOUS ONCE
Status: COMPLETED | OUTPATIENT
Start: 2024-08-02 | End: 2024-08-02

## 2024-08-02 RX ORDER — METHYLPREDNISOLONE ACETATE 40 MG/ML
INJECTION, SUSPENSION INTRA-ARTICULAR; INTRALESIONAL; INTRAMUSCULAR; SOFT TISSUE
Status: DISCONTINUED | OUTPATIENT
Start: 2024-08-02 | End: 2024-08-02 | Stop reason: HOSPADM

## 2024-08-02 RX ORDER — ACETAMINOPHEN 500 MG
1000 TABLET ORAL
Status: COMPLETED | OUTPATIENT
Start: 2024-08-02 | End: 2024-08-02

## 2024-08-02 RX ADMIN — SODIUM CHLORIDE, SODIUM LACTATE, POTASSIUM CHLORIDE, AND CALCIUM CHLORIDE: .6; .31; .03; .02 INJECTION, SOLUTION INTRAVENOUS at 07:08

## 2024-08-02 RX ADMIN — DEXAMETHASONE SODIUM PHOSPHATE 4 MG: 4 INJECTION, SOLUTION INTRAMUSCULAR; INTRAVENOUS at 08:08

## 2024-08-02 RX ADMIN — ONDANSETRON 4 MG: 2 INJECTION INTRAMUSCULAR; INTRAVENOUS at 08:08

## 2024-08-02 RX ADMIN — ACETAMINOPHEN 1000 MG: 500 TABLET ORAL at 07:08

## 2024-08-02 RX ADMIN — PROCHLORPERAZINE EDISYLATE 5 MG: 5 INJECTION INTRAMUSCULAR; INTRAVENOUS at 12:08

## 2024-08-02 RX ADMIN — HYDROMORPHONE HYDROCHLORIDE 0.4 MG: 2 INJECTION INTRAMUSCULAR; INTRAVENOUS; SUBCUTANEOUS at 09:08

## 2024-08-02 RX ADMIN — ONDANSETRON 4 MG: 2 INJECTION INTRAMUSCULAR; INTRAVENOUS at 10:08

## 2024-08-02 RX ADMIN — LIDOCAINE HYDROCHLORIDE 60 MG: 20 INJECTION, SOLUTION INTRAVENOUS at 08:08

## 2024-08-02 RX ADMIN — CEFAZOLIN 2 G: 2 INJECTION, POWDER, FOR SOLUTION INTRAMUSCULAR; INTRAVENOUS at 08:08

## 2024-08-02 RX ADMIN — OXYCODONE HYDROCHLORIDE 5 MG: 5 TABLET ORAL at 09:08

## 2024-08-02 RX ADMIN — PROPOFOL 200 MG: 10 INJECTION, EMULSION INTRAVENOUS at 08:08

## 2024-08-02 RX ADMIN — PROCHLORPERAZINE EDISYLATE 5 MG: 5 INJECTION INTRAMUSCULAR; INTRAVENOUS at 11:08

## 2024-08-02 RX ADMIN — FENTANYL CITRATE 100 MCG: 0.05 INJECTION, SOLUTION INTRAMUSCULAR; INTRAVENOUS at 08:08

## 2024-08-02 NOTE — ANESTHESIA PROCEDURE NOTES
Intubation    Date/Time: 8/2/2024 8:42 AM    Performed by: Kimberly Hernandez CRNA  Authorized by: Prabhjot Colvin MD    Intubation:     Induction:  Intravenous    Intubated:  Postinduction    Mask Ventilation:  Not attempted    Attempts:  1    Attempted By:  CRNA    Difficult Airway Encountered?: No      Complications:  None    Airway Device:  Supraglottic airway/LMA    Airway Device Size:  4.0 (igel)    Style/Cuff Inflation:  Cuffed (inflated to minimal occlusive pressure)    Secured at:  The lips    Placement Verified By:  Capnometry    Complicating Factors:  None    Findings Post-Intubation:  BS equal bilateral and atraumatic/condition of teeth unchanged

## 2024-08-02 NOTE — OP NOTE
[facility]  Brief Operative Note    Surgery Date: 8/2/2024     Surgeons and Role:     * Juan Antonio Ayala MD - Primary    Assisting Surgeon: ST Jesse    Pre-op Diagnosis:  Other tear of medial meniscus, current injury, left knee, initial encounter [S83.242A]    Post-op Diagnosis:  1.  Other tear of Medial meniscus left knee  2. Other tear of Lateral meniscus left knee     Procedure:  Arthroscopy with partial medial and lateral meniscectomy left knee    Anesthesia: General    Operative Findings:  After the appropriate consents were signed the patient understood and accepted all risks and complications he was brought to the operating room and underwent general anesthesia.  Tourniquet was applied to proximal left leg left leg was then placed in a leg tan and then prepped and draped in a sterile manner.  After exsanguination with the Esmarch bandage tourniquet was inflated to 250 mmHg.  Standard superior medial inflow was established and knee was inflated with saline.  Anterior lateral portal was established and scope was introduced.  Inspection of the patellofemoral joint demonstrated no significant chondromalacia.  Inspection of the medial compartment demonstrated a tear of the body and posterior horn of the medial meniscus.  An anterior medial portal was established with a spinal needle and a probe was introduced.  Using straight basket forceps and the shaver the medial meniscus was trimmed back to a stable rim.  There was grade 1 chondromalacia noted in the medial compartment.      Inspection of the intercondylar notch demonstrated an intact anterior cruciate ligament.  Inspection of the lateral compartment demonstrated some peripheral tearing of the body of the lateral meniscus and this was trimmed with straight basket forceps and the shaver.  No significant degenerative changes noted in the lateral compartment.  The knee was then washed out copiously with saline and 10 cc 0.25% Marcaine was  cc of Depo-Medrol were injected into the knee.  Portals were closed with 4 nylon suture and a sterile compressive dressing was applied.  Tourniquet was deflated patient was extubated and brought to recovery room in good condition.    Estimated Blood Loss:  Minimal         Specimens:   Specimen (24h ago, onward)      None              Discharge Note    OUTCOME: Patient tolerated treatment/procedure well without complication and is now ready for discharge.    DISPOSITION: Home or Self Care    FINAL DIAGNOSIS:  Other tear of medial meniscus, current injury, left knee, initial encounter    FOLLOWUP: In clinic    DISCHARGE INSTRUCTIONS:    Discharge Procedure Orders   CRUTCHES FOR HOME USE   Order Comments: Ok to change to walker if patient prefers.     Order Specific Question Answer Comments   Type: Axillary    Height: 5'11    Weight: 91 kg    Length of need (1-99 months): 99      Diet general     Activity as tolerated     Ice to affected area     Weight bearing restrictions (specify)   Order Comments: Weight Bearing as tolerated.     Remove dressing in 48 hours   Order Comments: Remove bulky dressing post op day 2. Cover sutures with band aid until seen in clinic.     Call MD for:  temperature >100.4     Call MD for:  persistent nausea and vomiting     Call MD for:  severe uncontrolled pain     Call MD for:  difficulty breathing, headache or visual disturbances     Call MD for:  redness, tenderness, or signs of infection (pain, swelling, redness, odor or green/yellow discharge around incision site)     Call MD for:  hives     Call MD for:  persistent dizziness or light-headedness     Call MD for:  extreme fatigue     Shower on day dressing removed (No bath)

## 2024-08-02 NOTE — TRANSFER OF CARE
Anesthesia Transfer of Care Note    Patient: Heri Medina     Procedure(s) Performed: Procedure(s) (LRB):  ARTHROSCOPY, KNEE, WITH MEDIAL OR LATERAL MENISCECTOMY (Left)    Patient location: PACU    Anesthesia Type: general    Transport from OR: Transported from OR on 6-10 L/min O2 by face mask with adequate spontaneous ventilation    Post pain: adequate analgesia    Post assessment: no apparent anesthetic complications and tolerated procedure well    Post vital signs: stable    Level of consciousness: awake    Nausea/Vomiting: no nausea/vomiting    Complications: none    Transfer of care protocol was followed      Last vitals: Visit Vitals  /87

## 2024-08-02 NOTE — PLAN OF CARE
Heri Medina Jr. has met all discharge criteria from Phase II. Vital Signs are stable, ambulating  without difficulty. Discharge instructions given, patient verbalized understanding. Discharged from facility via wheelchair in stable condition.

## 2024-08-02 NOTE — OR NURSING
Ambulates in room with crutches.  States he prefers to use the walker he has at home, as opposed to crutches. Vomits small amt clear liquid after ambulating, requests another dose of anti emetic.  Dr. Petty called, order for compazine received.

## 2024-08-02 NOTE — ANESTHESIA POSTPROCEDURE EVALUATION
Anesthesia Post Evaluation    Patient: Heri Medina JrMilton    Procedure(s) Performed: Procedure(s) (LRB):  ARTHROSCOPY, KNEE, WITH MEDIAL OR LATERAL MENISCECTOMY (Left)    Final Anesthesia Type: general      Patient location during evaluation: PACU  Patient participation: Yes- Able to Participate  Level of consciousness: awake and alert  Post-procedure vital signs: reviewed and stable  Pain management: adequate  Airway patency: patent    PONV status at discharge: No PONV  Anesthetic complications: no      Cardiovascular status: blood pressure returned to baseline  Respiratory status: unassisted  Hydration status: euvolemic  Follow-up not needed.              Vitals Value Taken Time   /84 08/02/24 0939   Temp 36.4 °C (97.6 °F) 08/02/24 0945   Pulse 62 08/02/24 0945   Resp 16 08/02/24 0927   SpO2 93 % 08/02/24 0945   Vitals shown include unfiled device data.      Event Time   Out of Recovery 09:50:48         Pain/Kenya Score: Pain Rating Prior to Med Admin: 7 (8/2/2024  9:27 AM)  Pain Rating Post Med Admin: 4 (states tolerable. Falls off to sleep.) (8/2/2024  9:39 AM)  Kenya Score: 9 (8/2/2024  9:39 AM)

## 2024-08-03 VITALS
RESPIRATION RATE: 16 BRPM | SYSTOLIC BLOOD PRESSURE: 150 MMHG | HEART RATE: 67 BPM | OXYGEN SATURATION: 93 % | DIASTOLIC BLOOD PRESSURE: 84 MMHG | TEMPERATURE: 98 F

## 2024-10-15 ENCOUNTER — LAB VISIT (OUTPATIENT)
Dept: LAB | Facility: HOSPITAL | Age: 69
End: 2024-10-15
Attending: INTERNAL MEDICINE
Payer: MEDICARE

## 2024-10-15 DIAGNOSIS — M15.9 GENERALIZED OSTEOARTHROSIS, INVOLVING MULTIPLE SITES: ICD-10-CM

## 2024-10-15 DIAGNOSIS — E11.9 DIABETES MELLITUS WITHOUT COMPLICATION: ICD-10-CM

## 2024-10-15 DIAGNOSIS — E78.2 MIXED HYPERLIPIDEMIA: Primary | ICD-10-CM

## 2024-10-15 LAB
ALBUMIN SERPL BCP-MCNC: 4.2 G/DL (ref 3.5–5.2)
ALP SERPL-CCNC: 91 U/L (ref 55–135)
ALT SERPL W/O P-5'-P-CCNC: 35 U/L (ref 10–44)
ANION GAP SERPL CALC-SCNC: 9 MMOL/L (ref 8–16)
AST SERPL-CCNC: 22 U/L (ref 10–40)
BASOPHILS # BLD AUTO: 0.07 K/UL (ref 0–0.2)
BASOPHILS NFR BLD: 1.2 % (ref 0–1.9)
BILIRUB SERPL-MCNC: 0.6 MG/DL (ref 0.1–1)
BUN SERPL-MCNC: 14 MG/DL (ref 8–23)
CALCIUM SERPL-MCNC: 9.8 MG/DL (ref 8.7–10.5)
CCP AB SER IA-ACNC: <0.5 U/ML
CHLORIDE SERPL-SCNC: 102 MMOL/L (ref 95–110)
CO2 SERPL-SCNC: 24 MMOL/L (ref 23–29)
CREAT SERPL-MCNC: 1.1 MG/DL (ref 0.5–1.4)
DIFFERENTIAL METHOD BLD: ABNORMAL
EOSINOPHIL # BLD AUTO: 0.1 K/UL (ref 0–0.5)
EOSINOPHIL NFR BLD: 1.5 % (ref 0–8)
ERYTHROCYTE [DISTWIDTH] IN BLOOD BY AUTOMATED COUNT: 14.3 % (ref 11.5–14.5)
EST. GFR  (NO RACE VARIABLE): >60 ML/MIN/1.73 M^2
ESTIMATED AVG GLUCOSE: 126 MG/DL (ref 68–131)
GLUCOSE SERPL-MCNC: 117 MG/DL (ref 70–110)
HBA1C MFR BLD: 6 % (ref 4–5.6)
HCT VFR BLD AUTO: 48 % (ref 40–54)
HGB BLD-MCNC: 15.1 G/DL (ref 14–18)
IMM GRANULOCYTES # BLD AUTO: 0.02 K/UL (ref 0–0.04)
IMM GRANULOCYTES NFR BLD AUTO: 0.3 % (ref 0–0.5)
LYMPHOCYTES # BLD AUTO: 1.4 K/UL (ref 1–4.8)
LYMPHOCYTES NFR BLD: 24.5 % (ref 18–48)
MCH RBC QN AUTO: 25.3 PG (ref 27–31)
MCHC RBC AUTO-ENTMCNC: 31.5 G/DL (ref 32–36)
MCV RBC AUTO: 80 FL (ref 82–98)
MONOCYTES # BLD AUTO: 0.7 K/UL (ref 0.3–1)
MONOCYTES NFR BLD: 12.3 % (ref 4–15)
NEUTROPHILS # BLD AUTO: 3.5 K/UL (ref 1.8–7.7)
NEUTROPHILS NFR BLD: 60.2 % (ref 38–73)
NRBC BLD-RTO: 0 /100 WBC
PLATELET # BLD AUTO: 257 K/UL (ref 150–450)
PMV BLD AUTO: 9.9 FL (ref 9.2–12.9)
POTASSIUM SERPL-SCNC: 4.3 MMOL/L (ref 3.5–5.1)
PROT SERPL-MCNC: 7.5 G/DL (ref 6–8.4)
RBC # BLD AUTO: 5.97 M/UL (ref 4.6–6.2)
SODIUM SERPL-SCNC: 135 MMOL/L (ref 136–145)
URATE SERPL-MCNC: 5.2 MG/DL (ref 3.4–7)
WBC # BLD AUTO: 5.84 K/UL (ref 3.9–12.7)

## 2024-10-15 PROCEDURE — 84550 ASSAY OF BLOOD/URIC ACID: CPT | Performed by: INTERNAL MEDICINE

## 2024-10-15 PROCEDURE — 80053 COMPREHEN METABOLIC PANEL: CPT | Performed by: INTERNAL MEDICINE

## 2024-10-15 PROCEDURE — 86225 DNA ANTIBODY NATIVE: CPT | Mod: 59 | Performed by: INTERNAL MEDICINE

## 2024-10-15 PROCEDURE — 86235 NUCLEAR ANTIGEN ANTIBODY: CPT | Mod: 59 | Performed by: INTERNAL MEDICINE

## 2024-10-15 PROCEDURE — 86200 CCP ANTIBODY: CPT | Performed by: INTERNAL MEDICINE

## 2024-10-15 PROCEDURE — 86038 ANTINUCLEAR ANTIBODIES: CPT | Performed by: INTERNAL MEDICINE

## 2024-10-15 PROCEDURE — 83036 HEMOGLOBIN GLYCOSYLATED A1C: CPT | Performed by: INTERNAL MEDICINE

## 2024-10-15 PROCEDURE — 85025 COMPLETE CBC W/AUTO DIFF WBC: CPT | Performed by: INTERNAL MEDICINE

## 2024-10-15 PROCEDURE — 36415 COLL VENOUS BLD VENIPUNCTURE: CPT | Performed by: INTERNAL MEDICINE

## 2024-10-15 PROCEDURE — 86039 ANTINUCLEAR ANTIBODIES (ANA): CPT | Performed by: INTERNAL MEDICINE

## 2024-10-16 LAB
ANA PATTERN 1: NORMAL
ANA SER QL IF: POSITIVE
ANA TITR SER IF: NORMAL {TITER}

## 2024-10-17 LAB — DNA TITER: 0

## 2024-10-18 LAB
ANTI SM ANTIBODY: 0.1 RATIO (ref 0–0.99)
ANTI SM/RNP ANTIBODY: 0.07 RATIO (ref 0–0.99)
ANTI-SM INTERPRETATION: NEGATIVE
ANTI-SM/RNP INTERPRETATION: NEGATIVE
ANTI-SSA ANTIBODY: 0.07 RATIO (ref 0–0.99)
ANTI-SSA INTERPRETATION: NEGATIVE
ANTI-SSB ANTIBODY: 0.07 RATIO (ref 0–0.99)
ANTI-SSB INTERPRETATION: NEGATIVE
DSDNA AB SER-ACNC: NORMAL [IU]/ML

## 2025-02-19 ENCOUNTER — LAB VISIT (OUTPATIENT)
Dept: LAB | Facility: HOSPITAL | Age: 70
End: 2025-02-19
Attending: INTERNAL MEDICINE
Payer: MEDICARE

## 2025-02-19 DIAGNOSIS — E78.2 MIXED HYPERLIPIDEMIA: Primary | ICD-10-CM

## 2025-02-19 DIAGNOSIS — Z11.3 SCREENING EXAMINATION FOR VENEREAL DISEASE: ICD-10-CM

## 2025-02-19 DIAGNOSIS — R73.9 BLOOD GLUCOSE ELEVATED: ICD-10-CM

## 2025-02-19 DIAGNOSIS — N40.0 BENIGN ENLARGEMENT OF PROSTATE: ICD-10-CM

## 2025-02-19 DIAGNOSIS — E53.8 VITAMIN B12 DEFICIENCY (NON ANEMIC): ICD-10-CM

## 2025-02-19 DIAGNOSIS — R00.2 PALPITATIONS: Primary | ICD-10-CM

## 2025-02-19 DIAGNOSIS — R00.2 PALPITATIONS: ICD-10-CM

## 2025-02-19 DIAGNOSIS — E78.2 MIXED HYPERLIPIDEMIA: ICD-10-CM

## 2025-02-19 LAB
ALBUMIN SERPL BCP-MCNC: 4.2 G/DL (ref 3.5–5.2)
ALBUMIN/CREAT UR: 14.8 UG/MG (ref 0–30)
ALP SERPL-CCNC: 81 U/L (ref 40–150)
ALT SERPL W/O P-5'-P-CCNC: 27 U/L (ref 10–44)
ANION GAP SERPL CALC-SCNC: 11 MMOL/L (ref 8–16)
AST SERPL-CCNC: 21 U/L (ref 10–40)
BILIRUB SERPL-MCNC: 0.4 MG/DL (ref 0.1–1)
BUN SERPL-MCNC: 11 MG/DL (ref 8–23)
CALCIUM SERPL-MCNC: 9.8 MG/DL (ref 8.7–10.5)
CHLORIDE SERPL-SCNC: 105 MMOL/L (ref 95–110)
CHOLEST SERPL-MCNC: 151 MG/DL (ref 120–199)
CHOLEST/HDLC SERPL: 4.3 {RATIO} (ref 2–5)
CO2 SERPL-SCNC: 22 MMOL/L (ref 23–29)
CREAT SERPL-MCNC: 1 MG/DL (ref 0.5–1.4)
CREAT UR-MCNC: 229 MG/DL (ref 23–375)
EST. GFR  (NO RACE VARIABLE): >60 ML/MIN/1.73 M^2
ESTIMATED AVG GLUCOSE: 126 MG/DL (ref 68–131)
GLUCOSE SERPL-MCNC: 96 MG/DL (ref 70–110)
HBA1C MFR BLD: 6 % (ref 4–5.6)
HDLC SERPL-MCNC: 35 MG/DL (ref 40–75)
HDLC SERPL: 23.2 % (ref 20–50)
LDLC SERPL CALC-MCNC: 86.6 MG/DL (ref 63–159)
MICROALBUMIN UR DL<=1MG/L-MCNC: 34 UG/ML
NONHDLC SERPL-MCNC: 116 MG/DL
OHS QRS DURATION: 78 MS
OHS QTC CALCULATION: 399 MS
POTASSIUM SERPL-SCNC: 4.4 MMOL/L (ref 3.5–5.1)
PROT SERPL-MCNC: 7.6 G/DL (ref 6–8.4)
SODIUM SERPL-SCNC: 138 MMOL/L (ref 136–145)
T4 FREE SERPL-MCNC: 0.98 NG/DL (ref 0.71–1.51)
TRIGL SERPL-MCNC: 147 MG/DL (ref 30–150)
TSH SERPL DL<=0.005 MIU/L-ACNC: 1.91 UIU/ML (ref 0.4–4)
VIT B12 SERPL-MCNC: >2000 PG/ML (ref 210–950)

## 2025-02-19 PROCEDURE — 80053 COMPREHEN METABOLIC PANEL: CPT | Performed by: INTERNAL MEDICINE

## 2025-02-19 PROCEDURE — 84443 ASSAY THYROID STIM HORMONE: CPT | Performed by: INTERNAL MEDICINE

## 2025-02-19 PROCEDURE — 84153 ASSAY OF PSA TOTAL: CPT | Performed by: INTERNAL MEDICINE

## 2025-02-19 PROCEDURE — 36415 COLL VENOUS BLD VENIPUNCTURE: CPT | Performed by: INTERNAL MEDICINE

## 2025-02-19 PROCEDURE — 82306 VITAMIN D 25 HYDROXY: CPT | Performed by: INTERNAL MEDICINE

## 2025-02-19 PROCEDURE — 93010 ELECTROCARDIOGRAM REPORT: CPT | Mod: ,,, | Performed by: INTERNAL MEDICINE

## 2025-02-19 PROCEDURE — 84439 ASSAY OF FREE THYROXINE: CPT | Performed by: INTERNAL MEDICINE

## 2025-02-19 PROCEDURE — 93005 ELECTROCARDIOGRAM TRACING: CPT

## 2025-02-19 PROCEDURE — 80061 LIPID PANEL: CPT | Performed by: INTERNAL MEDICINE

## 2025-02-19 PROCEDURE — 82570 ASSAY OF URINE CREATININE: CPT | Performed by: INTERNAL MEDICINE

## 2025-02-19 PROCEDURE — 83036 HEMOGLOBIN GLYCOSYLATED A1C: CPT | Performed by: INTERNAL MEDICINE

## 2025-02-19 PROCEDURE — 82607 VITAMIN B-12: CPT | Mod: GA | Performed by: INTERNAL MEDICINE

## 2025-02-20 LAB
25(OH)D3+25(OH)D2 SERPL-MCNC: 32 NG/ML (ref 30–96)
PROSTATE SPECIFIC ANTIGEN, TOTAL: 4.2 NG/ML (ref 0–4)
PSA FREE MFR SERPL: 27.86 %
PSA FREE SERPL-MCNC: 1.17 NG/ML (ref 0–1.5)

## (undated) DEVICE — SCRUB 10% POVIDONE IODINE 4OZ

## (undated) DEVICE — BANDAGE ESMARK ELASTIC ST 6X9

## (undated) DEVICE — SEE MEDLINE ITEM 157117

## (undated) DEVICE — BLADE SAW SAG 25.40MM X 1.27MM

## (undated) DEVICE — DRAPE C-ARM/MOBILE XRAY 44X80

## (undated) DEVICE — TOURNIQUET SB QC DP 34X4IN

## (undated) DEVICE — UNDERGLOVES BIOGEL PI SIZE 8.5

## (undated) DEVICE — DRESSING XEROFORM NONADH 1X8IN

## (undated) DEVICE — Device

## (undated) DEVICE — APPLICATOR ARISTA FLEX XL

## (undated) DEVICE — MANIFOLD 4 PORT

## (undated) DEVICE — ELECTRODE REM PLYHSV RETURN 9

## (undated) DEVICE — SUPPORT ULNA NERVE PROTECTOR

## (undated) DEVICE — ALCOHOL ISOPROPYL BLU 70% 16OZ

## (undated) DEVICE — NDL INSUF ULTRA VERESS 120MM

## (undated) DEVICE — TROCAR ENDOPATH XCEL 11MM 10CM

## (undated) DEVICE — DISSECTOR EPIX LAPA 5MMX35CM

## (undated) DEVICE — DRESSING TEGADERM 2 3/8 X 2.75

## (undated) DEVICE — SOL NS 1000CC

## (undated) DEVICE — BLADE SURG CARBON STEEL #10

## (undated) DEVICE — SEE MEDLINE ITEM 152622

## (undated) DEVICE — DRAPE EXTREMITY ORTHOMAX

## (undated) DEVICE — BLADE SHAVER LANZA 4.2X13CM

## (undated) DEVICE — SEE MEDLINE ITEM 157116

## (undated) DEVICE — ALCOHOL 70% ISOP W/GREEN 16OZ

## (undated) DEVICE — APPLIER CLIP ENDO LIGAMAX 5MM

## (undated) DEVICE — IRRIGATOR ENDOSCOPY DISP.

## (undated) DEVICE — APPLICATOR CHLORAPREP ORN 26ML

## (undated) DEVICE — DRAPE HIP TIBURON 87X115X134

## (undated) DEVICE — DRESSING AQUACEL SACRAL 9 X 9

## (undated) DEVICE — SPONGE LAP 18X18 PREWASHED

## (undated) DEVICE — CLOSURE SKIN STERI STRIP 1/2X4

## (undated) DEVICE — HOOD T-5 TEAR AWAY STERILE

## (undated) DEVICE — DRESSING MEPILEX BORDR AG 4X10

## (undated) DEVICE — TUBING 4-LEAD ARTHOSCOPY

## (undated) DEVICE — SCISSOR 5MMX35CM DIRECT DRIVE

## (undated) DEVICE — SUT 4.0 ETHILON

## (undated) DEVICE — ADHESIVE DERMABOND ADVANCED

## (undated) DEVICE — SEE MEDLINE ITEM 157216

## (undated) DEVICE — DRESSING XEROFORM FOIL PK 1X8

## (undated) DEVICE — UNDERGLOVES BIOGEL PI SZ 7 LF

## (undated) DEVICE — HANDPIECE PHOTONSABER Y TIP

## (undated) DEVICE — SUT MONOCRYL 4-0 PS-2

## (undated) DEVICE — SEE MEDLINE ITEM 157131

## (undated) DEVICE — DRAPE PLASTIC U 60X72

## (undated) DEVICE — GLOVE BIOGEL SKINSENSE PI 7.0

## (undated) DEVICE — SUT CTD VICRYL CT-1 27

## (undated) DEVICE — SEE MEDLINE ITEM 156952

## (undated) DEVICE — DRAPE TIBURON ORTHOPEDIC SPLIT

## (undated) DEVICE — SUT MONOCRYL 3-0 PS-1

## (undated) DEVICE — GAUZE SPONGE 4X4 12PLY

## (undated) DEVICE — SYR ONLY LUER LOCK 20CC

## (undated) DEVICE — TROCAR ENDOPATH XCEL 5X75MM

## (undated) DEVICE — DRAPE STERI U-SHAPED 47X51IN

## (undated) DEVICE — SOL NACL IRR 3000ML

## (undated) DEVICE — DRAPE INCISE IOBAN 2 23X33IN

## (undated) DEVICE — GLOVE LINER CUT RESISTANT LG

## (undated) DEVICE — GLOVE BIOGEL SKINSENSE PI 8.5

## (undated) DEVICE — NDL 22GA X1 1/2 REG BEVEL

## (undated) DEVICE — PULSAVAC ZIMMER

## (undated) DEVICE — BANDAGE MATRIX HK LOOP 6IN 5YD

## (undated) DEVICE — SYR SLIP TIP 1CC

## (undated) DEVICE — BLADE ELECTRO EXTENDED.

## (undated) DEVICE — TROCAR ENDOPATH XCEL 5MM 7.5CM

## (undated) DEVICE — UNDERGLOVES BIOGEL PI SIZE 8

## (undated) DEVICE — COVER OVERHEAD SURG LT BLUE

## (undated) DEVICE — SPONGE COTTON TRAY 4X4IN

## (undated) DEVICE — SUT CTD VICRYL 2.0

## (undated) DEVICE — GLOVE BIOGEL ECLIPSE SZ 8

## (undated) DEVICE — BAG TISSUE RETRIEVAL 225ML

## (undated) DEVICE — SPONGE DERMA 8PLY 2X2

## (undated) DEVICE — SEE MEDLINE ITEM 152530

## (undated) DEVICE — SEE MEDLINE ITEM 156955

## (undated) DEVICE — POWDER ARISTA AH 3G

## (undated) DEVICE — COVER BACK TABLE 72X21

## (undated) DEVICE — SEE MEDLINE ITEM 146292

## (undated) DEVICE — DRAPE STERI INSTRUMENT 1018

## (undated) DEVICE — KIT EVACUATOR 3-SPRING 1/8 DRN

## (undated) DEVICE — GLOVE BIOGEL ECLIPSE SZ 7.5

## (undated) DEVICE — SOL IRR SOD CHL .9% POUR

## (undated) DEVICE — PACK OPTIMA GEN ORTHO

## (undated) DEVICE — SUT 0 VICRYL / UR6 (J603)